# Patient Record
Sex: FEMALE | Race: BLACK OR AFRICAN AMERICAN | NOT HISPANIC OR LATINO | RURAL
[De-identification: names, ages, dates, MRNs, and addresses within clinical notes are randomized per-mention and may not be internally consistent; named-entity substitution may affect disease eponyms.]

---

## 2022-10-24 LAB — BCS RECOMMENDATION EXT: NORMAL

## 2022-12-03 ENCOUNTER — OFFICE VISIT (OUTPATIENT)
Dept: FAMILY MEDICINE | Facility: CLINIC | Age: 62
End: 2022-12-03
Payer: COMMERCIAL

## 2022-12-03 VITALS
OXYGEN SATURATION: 98 % | SYSTOLIC BLOOD PRESSURE: 164 MMHG | TEMPERATURE: 100 F | HEIGHT: 69 IN | RESPIRATION RATE: 20 BRPM | WEIGHT: 172 LBS | HEART RATE: 104 BPM | DIASTOLIC BLOOD PRESSURE: 102 MMHG | BODY MASS INDEX: 25.48 KG/M2

## 2022-12-03 DIAGNOSIS — R20.0 HAND NUMBNESS: ICD-10-CM

## 2022-12-03 DIAGNOSIS — Z20.828 CONTACT WITH OR EXPOSURE TO VIRAL DISEASE: Primary | ICD-10-CM

## 2022-12-03 LAB
CTP QC/QA: YES
CTP QC/QA: YES
FLUAV AG NPH QL: POSITIVE
FLUBV AG NPH QL: NEGATIVE
SARS-COV-2 AG RESP QL IA.RAPID: NEGATIVE

## 2022-12-03 PROCEDURE — 3080F PR MOST RECENT DIASTOLIC BLOOD PRESSURE >= 90 MM HG: ICD-10-PCS | Mod: ,,, | Performed by: FAMILY MEDICINE

## 2022-12-03 PROCEDURE — 87804 INFLUENZA ASSAY W/OPTIC: CPT | Mod: QW,,, | Performed by: FAMILY MEDICINE

## 2022-12-03 PROCEDURE — 3008F BODY MASS INDEX DOCD: CPT | Mod: ,,, | Performed by: FAMILY MEDICINE

## 2022-12-03 PROCEDURE — 99214 PR OFFICE/OUTPT VISIT, EST, LEVL IV, 30-39 MIN: ICD-10-PCS | Mod: ,,, | Performed by: FAMILY MEDICINE

## 2022-12-03 PROCEDURE — 99214 OFFICE O/P EST MOD 30 MIN: CPT | Mod: ,,, | Performed by: FAMILY MEDICINE

## 2022-12-03 PROCEDURE — 3077F PR MOST RECENT SYSTOLIC BLOOD PRESSURE >= 140 MM HG: ICD-10-PCS | Mod: ,,, | Performed by: FAMILY MEDICINE

## 2022-12-03 PROCEDURE — 87804 POCT INFLUENZA A/B: ICD-10-PCS | Mod: QW,,, | Performed by: FAMILY MEDICINE

## 2022-12-03 PROCEDURE — 3008F PR BODY MASS INDEX (BMI) DOCUMENTED: ICD-10-PCS | Mod: ,,, | Performed by: FAMILY MEDICINE

## 2022-12-03 PROCEDURE — 99051 PR MEDICAL SERVICES, EVE/WKEND/HOLIDAY: ICD-10-PCS | Mod: ,,, | Performed by: FAMILY MEDICINE

## 2022-12-03 PROCEDURE — 87426 SARSCOV CORONAVIRUS AG IA: CPT | Mod: QW,,, | Performed by: FAMILY MEDICINE

## 2022-12-03 PROCEDURE — 3077F SYST BP >= 140 MM HG: CPT | Mod: ,,, | Performed by: FAMILY MEDICINE

## 2022-12-03 PROCEDURE — 99051 MED SERV EVE/WKEND/HOLIDAY: CPT | Mod: ,,, | Performed by: FAMILY MEDICINE

## 2022-12-03 PROCEDURE — 3080F DIAST BP >= 90 MM HG: CPT | Mod: ,,, | Performed by: FAMILY MEDICINE

## 2022-12-03 PROCEDURE — 87426 SARS CORONAVIRUS 2 ANTIGEN POCT: ICD-10-PCS | Mod: QW,,, | Performed by: FAMILY MEDICINE

## 2022-12-03 RX ORDER — OSELTAMIVIR PHOSPHATE 75 MG/1
75 CAPSULE ORAL 2 TIMES DAILY
Qty: 10 CAPSULE | Refills: 0 | Status: SHIPPED | OUTPATIENT
Start: 2022-12-03 | End: 2022-12-03

## 2022-12-03 NOTE — LETTER
December 3, 2022      [facility]  [address] [facility] [place] [address]  Phone: [phone]  Fax: [phone]       Patient: Asia Schwartz   YOB: 1960  Date of Visit: 12/03/2022    To Whom It May Concern:    Parag Schwartz  was at  on 12/03/2022. The patient may return to work/school on 12/08/2022 with no restrictions. If you have any questions or concerns, or if I can be of further assistance, please do not hesitate to contact me.    Sincerely,    Dr. Gilberto Gamboa

## 2022-12-03 NOTE — PROGRESS NOTES
"Subjective:       Patient ID: Asia Schwartz is a 62 y.o. female.    Chief Complaint: Cough, Headache, Sinus Problem (Sneezing), and "Tingling" in feet    Cough sinus congestion for several days no fever.  Tingling and feet for several days she has been up on her feet much more recently.  Also with numbness in both hands when she wakes up in the morning sometimes her hands or little swollen.  No neck pain.    Cough  Associated symptoms include headaches.   Headache   Associated symptoms include coughing.   Sinus Problem  Associated symptoms include coughing and headaches.   Review of Systems   Respiratory:  Positive for cough.    Neurological:  Positive for headaches.       Objective:      Physical Exam  Constitutional:       General: She is not in acute distress.     Appearance: Normal appearance. She is not ill-appearing or toxic-appearing.   HENT:      Right Ear: Tympanic membrane normal.      Left Ear: Tympanic membrane normal.      Nose: Congestion present.      Mouth/Throat:      Pharynx: No posterior oropharyngeal erythema.   Cardiovascular:      Rate and Rhythm: Normal rate and regular rhythm.   Pulmonary:      Effort: Pulmonary effort is normal.      Breath sounds: Normal breath sounds.   Musculoskeletal:      Right shoulder: Normal.      Left shoulder: Normal.      Right wrist: Normal.      Left wrist: Normal.      Right hand: Normal.      Left hand: Normal.      Cervical back: Normal.      Right foot: Normal range of motion. No tenderness.      Left foot: Normal range of motion. No tenderness.      Comments: High arches   Neurological:      Mental Status: She is alert.      Sensory: No sensory deficit.      Motor: No weakness.      Deep Tendon Reflexes: Reflexes normal.       Assessment:       Problem List Items Addressed This Visit    None  Visit Diagnoses       Contact with or exposure to viral disease    -  Primary    Relevant Orders    POCT Influenza A/B (Completed)    SARS Coronavirus 2 Antigen, " POCT (Completed)    Hand numbness        Relevant Orders    X-Ray Chest PA And Lateral    X-Ray Cervical Spine 2 or 3 Views    Ambulatory referral/consult to Neurology              Plan:     Tamiflu  Numbness in her toes probably due to metatarsalgia.  Chest x-ray is normal C-spine shows moderate degenerative changes.  Recommend wearing shoes with more padding.  Upper extremity symptoms may be due to thoracic outlet syndrome

## 2023-02-08 ENCOUNTER — OFFICE VISIT (OUTPATIENT)
Dept: FAMILY MEDICINE | Facility: CLINIC | Age: 63
End: 2023-02-08
Payer: COMMERCIAL

## 2023-02-08 VITALS
RESPIRATION RATE: 18 BRPM | HEIGHT: 69 IN | OXYGEN SATURATION: 98 % | DIASTOLIC BLOOD PRESSURE: 72 MMHG | SYSTOLIC BLOOD PRESSURE: 120 MMHG | BODY MASS INDEX: 25.86 KG/M2 | HEART RATE: 93 BPM | WEIGHT: 174.63 LBS | TEMPERATURE: 98 F

## 2023-02-08 DIAGNOSIS — J01.00 ACUTE NON-RECURRENT MAXILLARY SINUSITIS: Primary | ICD-10-CM

## 2023-02-08 PROCEDURE — 3008F BODY MASS INDEX DOCD: CPT | Mod: ,,, | Performed by: NURSE PRACTITIONER

## 2023-02-08 PROCEDURE — 3078F DIAST BP <80 MM HG: CPT | Mod: ,,, | Performed by: NURSE PRACTITIONER

## 2023-02-08 PROCEDURE — 99213 PR OFFICE/OUTPT VISIT, EST, LEVL III, 20-29 MIN: ICD-10-PCS | Mod: 25,,, | Performed by: NURSE PRACTITIONER

## 2023-02-08 PROCEDURE — 3074F PR MOST RECENT SYSTOLIC BLOOD PRESSURE < 130 MM HG: ICD-10-PCS | Mod: ,,, | Performed by: NURSE PRACTITIONER

## 2023-02-08 PROCEDURE — 3078F PR MOST RECENT DIASTOLIC BLOOD PRESSURE < 80 MM HG: ICD-10-PCS | Mod: ,,, | Performed by: NURSE PRACTITIONER

## 2023-02-08 PROCEDURE — 96372 PR INJECTION,THERAP/PROPH/DIAG2ST, IM OR SUBCUT: ICD-10-PCS | Mod: ,,, | Performed by: NURSE PRACTITIONER

## 2023-02-08 PROCEDURE — 99213 OFFICE O/P EST LOW 20 MIN: CPT | Mod: 25,,, | Performed by: NURSE PRACTITIONER

## 2023-02-08 PROCEDURE — 3074F SYST BP LT 130 MM HG: CPT | Mod: ,,, | Performed by: NURSE PRACTITIONER

## 2023-02-08 PROCEDURE — 3008F PR BODY MASS INDEX (BMI) DOCUMENTED: ICD-10-PCS | Mod: ,,, | Performed by: NURSE PRACTITIONER

## 2023-02-08 PROCEDURE — 96372 THER/PROPH/DIAG INJ SC/IM: CPT | Mod: ,,, | Performed by: NURSE PRACTITIONER

## 2023-02-08 RX ORDER — CHLORPHENIRAMINE MALEATE AND DEXTROMETHORPHAN HYDROBROMIDE 4; 30 MG/1; MG/1
1 TABLET, FILM COATED ORAL EVERY 6 HOURS
Qty: 20 EACH | Refills: 1 | Status: SHIPPED | OUTPATIENT
Start: 2023-02-08 | End: 2023-02-18

## 2023-02-08 RX ORDER — FLUCONAZOLE 150 MG/1
150 TABLET ORAL DAILY
Qty: 1 TABLET | Refills: 0 | Status: SHIPPED | OUTPATIENT
Start: 2023-02-08 | End: 2023-02-09

## 2023-02-08 RX ORDER — CEFTRIAXONE 1 G/1
1 INJECTION, POWDER, FOR SOLUTION INTRAMUSCULAR; INTRAVENOUS
Status: COMPLETED | OUTPATIENT
Start: 2023-02-08 | End: 2023-02-08

## 2023-02-08 RX ORDER — AMOXICILLIN AND CLAVULANATE POTASSIUM 875; 125 MG/1; MG/1
1 TABLET, FILM COATED ORAL 2 TIMES DAILY
Qty: 20 TABLET | Refills: 0 | Status: SHIPPED | OUTPATIENT
Start: 2023-02-08 | End: 2023-02-18

## 2023-02-08 RX ORDER — DEXAMETHASONE SODIUM PHOSPHATE 4 MG/ML
8 INJECTION, SOLUTION INTRA-ARTICULAR; INTRALESIONAL; INTRAMUSCULAR; INTRAVENOUS; SOFT TISSUE ONCE
Status: COMPLETED | OUTPATIENT
Start: 2023-02-08 | End: 2023-02-08

## 2023-02-08 RX ADMIN — CEFTRIAXONE 1 G: 1 INJECTION, POWDER, FOR SOLUTION INTRAMUSCULAR; INTRAVENOUS at 02:02

## 2023-02-08 RX ADMIN — DEXAMETHASONE SODIUM PHOSPHATE 8 MG: 4 INJECTION, SOLUTION INTRA-ARTICULAR; INTRALESIONAL; INTRAMUSCULAR; INTRAVENOUS; SOFT TISSUE at 02:02

## 2023-02-08 NOTE — LETTER
February 8, 2023      [facility]  [address] [facility] [place] [address]  Phone: [phone]  Fax: [phone]       Patient: Asia Schwartz   YOB: 1960  Date of Visit: 02/08/2023    To Whom It May Concern:    Parag Schwartz  was at [facility] on 02/08/2023. The patient may return to work/school on 02/09/2023 with no restrictions. If you have any questions or concerns, or if I can be of further assistance, please do not hesitate to contact me.    Sincerely,    Mat Klein LPN

## 2023-02-08 NOTE — PROGRESS NOTES
Subjective:       Patient ID: Asia Schwartz is a 62 y.o. female.    Chief Complaint: Cough and Sinusitis    Sinus and cough-refused testing  Review of Systems   Constitutional:  Negative for appetite change, fatigue and fever.   HENT:  Positive for nasal congestion and sinus pressure/congestion. Negative for ear pain and sore throat.    Eyes:  Negative for pain, discharge and itching.   Respiratory:  Positive for cough. Negative for shortness of breath.    Cardiovascular:  Negative for chest pain and leg swelling.   Gastrointestinal:  Negative for abdominal pain, change in bowel habit, nausea, vomiting and change in bowel habit.   Musculoskeletal:  Negative for back pain, gait problem and neck pain.   Integumentary:  Negative for rash and wound.   Allergic/Immunologic: Negative for immunocompromised state.   Neurological:  Negative for dizziness, weakness and headaches.   All other systems reviewed and are negative.      Objective:      Physical Exam  Vitals and nursing note reviewed.   Constitutional:       General: She is not in acute distress.     Appearance: Normal appearance. She is not ill-appearing, toxic-appearing or diaphoretic.   HENT:      Head: Normocephalic.      Right Ear: Tympanic membrane, ear canal and external ear normal.      Left Ear: Tympanic membrane, ear canal and external ear normal.      Nose: Mucosal edema and congestion present. No rhinorrhea.      Right Turbinates: Swollen.      Left Turbinates: Swollen.      Right Sinus: Maxillary sinus tenderness present.      Left Sinus: Maxillary sinus tenderness present.      Mouth/Throat:      Mouth: Mucous membranes are moist.      Pharynx: Posterior oropharyngeal erythema present. No oropharyngeal exudate.   Eyes:      General: No scleral icterus.        Right eye: No discharge.         Left eye: No discharge.      Extraocular Movements: Extraocular movements intact.      Conjunctiva/sclera: Conjunctivae normal.      Pupils: Pupils are equal,  round, and reactive to light.   Cardiovascular:      Rate and Rhythm: Normal rate and regular rhythm.      Pulses: Normal pulses.      Heart sounds: Normal heart sounds. No murmur heard.  Pulmonary:      Effort: Pulmonary effort is normal. No respiratory distress.      Breath sounds: Normal breath sounds. No wheezing, rhonchi or rales.   Musculoskeletal:         General: Normal range of motion.      Cervical back: Neck supple. No tenderness.   Lymphadenopathy:      Cervical: No cervical adenopathy.   Skin:     General: Skin is warm and dry.      Capillary Refill: Capillary refill takes less than 2 seconds.      Findings: No rash.   Neurological:      Mental Status: She is alert and oriented to person, place, and time.   Psychiatric:         Mood and Affect: Mood normal.         Behavior: Behavior normal.         Thought Content: Thought content normal.         Judgment: Judgment normal.          Assessment:       1. Acute non-recurrent maxillary sinusitis        Plan:   Acute non-recurrent maxillary sinusitis  -     dexAMETHasone injection 8 mg  -     cefTRIAXone injection 1 g  -     amoxicillin-clavulanate 875-125mg (AUGMENTIN) 875-125 mg per tablet; Take 1 tablet by mouth 2 (two) times daily. for 20 doses  Dispense: 20 tablet; Refill: 0  -     chlorpheniramine-dextromethorp (CORICIDIN HBP COUGH AND COLD) 4-30 mg Tab; Take 1 tablet by mouth every 6 (six) hours. for 10 days  Dispense: 20 each; Refill: 1  -     fluconazole (DIFLUCAN) 150 MG Tab; Take 1 tablet (150 mg total) by mouth once daily. for 1 day  Dispense: 1 tablet; Refill: 0         Risks, benefits, and side effects were discussed with the patient. All questions were answered to the fullest satisfaction of the patient, and pt verbalized understanding and agreement to treatment plan. Pt was to call with any new or worsening symptoms, or present to the ER

## 2023-04-13 ENCOUNTER — HOSPITAL ENCOUNTER (EMERGENCY)
Facility: HOSPITAL | Age: 63
Discharge: HOME | End: 2023-04-13
Payer: COMMERCIAL

## 2023-04-13 ENCOUNTER — OFFICE VISIT (OUTPATIENT)
Dept: FAMILY MEDICINE | Facility: CLINIC | Age: 63
End: 2023-04-13
Payer: COMMERCIAL

## 2023-04-13 VITALS
RESPIRATION RATE: 18 BRPM | TEMPERATURE: 99 F | WEIGHT: 173 LBS | DIASTOLIC BLOOD PRESSURE: 101 MMHG | HEART RATE: 90 BPM | OXYGEN SATURATION: 96 % | HEIGHT: 69 IN | SYSTOLIC BLOOD PRESSURE: 189 MMHG | BODY MASS INDEX: 25.62 KG/M2

## 2023-04-13 VITALS
TEMPERATURE: 99 F | HEIGHT: 69 IN | SYSTOLIC BLOOD PRESSURE: 183 MMHG | OXYGEN SATURATION: 98 % | DIASTOLIC BLOOD PRESSURE: 110 MMHG | WEIGHT: 173 LBS | HEART RATE: 82 BPM | RESPIRATION RATE: 18 BRPM | BODY MASS INDEX: 25.62 KG/M2

## 2023-04-13 DIAGNOSIS — I10 ESSENTIAL HYPERTENSION: ICD-10-CM

## 2023-04-13 DIAGNOSIS — Z20.828 VIRAL DISEASE EXPOSURE: ICD-10-CM

## 2023-04-13 DIAGNOSIS — I16.0 HYPERTENSIVE URGENCY: Primary | ICD-10-CM

## 2023-04-13 DIAGNOSIS — R03.0 ELEVATED BLOOD PRESSURE READING: ICD-10-CM

## 2023-04-13 DIAGNOSIS — J32.9 SINUSITIS, UNSPECIFIED CHRONICITY, UNSPECIFIED LOCATION: ICD-10-CM

## 2023-04-13 DIAGNOSIS — I10 HYPERTENSION, UNSPECIFIED TYPE: Primary | ICD-10-CM

## 2023-04-13 DIAGNOSIS — H10.9 CONJUNCTIVITIS OF LEFT EYE, UNSPECIFIED CONJUNCTIVITIS TYPE: ICD-10-CM

## 2023-04-13 DIAGNOSIS — Z13.1 SCREENING FOR DIABETES MELLITUS: ICD-10-CM

## 2023-04-13 LAB
ALBUMIN SERPL BCP-MCNC: 4 G/DL (ref 3.5–5)
ALBUMIN/GLOB SERPL: 1.1 {RATIO}
ALP SERPL-CCNC: 96 U/L (ref 50–130)
ALT SERPL W P-5'-P-CCNC: 36 U/L (ref 13–56)
ANION GAP SERPL CALCULATED.3IONS-SCNC: 13 MMOL/L (ref 7–16)
AST SERPL W P-5'-P-CCNC: 21 U/L (ref 15–37)
BACTERIA #/AREA URNS HPF: ABNORMAL /HPF
BASOPHILS # BLD AUTO: 0.03 K/UL (ref 0–0.2)
BASOPHILS NFR BLD AUTO: 0.3 % (ref 0–1)
BILIRUB SERPL-MCNC: 0.3 MG/DL (ref ?–1.2)
BILIRUB UR QL STRIP: NEGATIVE
BUN SERPL-MCNC: 9 MG/DL (ref 7–18)
BUN/CREAT SERPL: 13 (ref 6–20)
CALCIUM SERPL-MCNC: 9.2 MG/DL (ref 8.5–10.1)
CHLORIDE SERPL-SCNC: 105 MMOL/L (ref 98–107)
CLARITY UR: CLEAR
CO2 SERPL-SCNC: 29 MMOL/L (ref 21–32)
COLOR UR: ABNORMAL
CREAT SERPL-MCNC: 0.68 MG/DL (ref 0.55–1.02)
CTP QC/QA: YES
DIFFERENTIAL METHOD BLD: ABNORMAL
EGFR (NO RACE VARIABLE) (RUSH/TITUS): 100 ML/MIN/1.73M²
EOSINOPHIL # BLD AUTO: 0.14 K/UL (ref 0–0.5)
EOSINOPHIL NFR BLD AUTO: 1.6 % (ref 1–4)
ERYTHROCYTE [DISTWIDTH] IN BLOOD BY AUTOMATED COUNT: 15.9 % (ref 11.5–14.5)
GLOBULIN SER-MCNC: 3.8 G/DL (ref 2–4)
GLUCOSE SERPL-MCNC: 106 MG/DL (ref 70–110)
GLUCOSE SERPL-MCNC: 96 MG/DL (ref 74–106)
GLUCOSE UR STRIP-MCNC: NORMAL MG/DL
HCT VFR BLD AUTO: 43.5 % (ref 38–47)
HGB BLD-MCNC: 13.3 G/DL (ref 12–16)
IMM GRANULOCYTES # BLD AUTO: 0.09 K/UL (ref 0–0.04)
IMM GRANULOCYTES NFR BLD: 1 % (ref 0–0.4)
KETONES UR STRIP-SCNC: NEGATIVE MG/DL
LEUKOCYTE ESTERASE UR QL STRIP: ABNORMAL
LYMPHOCYTES # BLD AUTO: 3.46 K/UL (ref 1–4.8)
LYMPHOCYTES NFR BLD AUTO: 39.8 % (ref 27–41)
MCH RBC QN AUTO: 26.3 PG (ref 27–31)
MCHC RBC AUTO-ENTMCNC: 30.6 G/DL (ref 32–36)
MCV RBC AUTO: 86.1 FL (ref 80–96)
MONOCYTES # BLD AUTO: 0.69 K/UL (ref 0–0.8)
MONOCYTES NFR BLD AUTO: 7.9 % (ref 2–6)
MPC BLD CALC-MCNC: 10.4 FL (ref 9.4–12.4)
MUCOUS THREADS #/AREA URNS HPF: ABNORMAL /HPF
NEUTROPHILS # BLD AUTO: 4.28 K/UL (ref 1.8–7.7)
NEUTROPHILS NFR BLD AUTO: 49.4 % (ref 53–65)
NITRITE UR QL STRIP: NEGATIVE
NRBC # BLD AUTO: 0 X10E3/UL
NRBC, AUTO (.00): 0 %
PH UR STRIP: 5.5 PH UNITS
PLATELET # BLD AUTO: 310 K/UL (ref 150–400)
POTASSIUM SERPL-SCNC: 3.6 MMOL/L (ref 3.5–5.1)
PROT SERPL-MCNC: 7.8 G/DL (ref 6.4–8.2)
PROT UR QL STRIP: NEGATIVE
RBC # BLD AUTO: 5.05 M/UL (ref 4.2–5.4)
RBC # UR STRIP: NEGATIVE /UL
RBC #/AREA URNS HPF: ABNORMAL /HPF
SARS-COV-2 AG RESP QL IA.RAPID: NEGATIVE
SODIUM SERPL-SCNC: 143 MMOL/L (ref 136–145)
SP GR UR STRIP: 1.01
SQUAMOUS #/AREA URNS LPF: ABNORMAL /LPF
TRICHOMONAS #/AREA URNS HPF: ABNORMAL /HPF
UROBILINOGEN UR STRIP-ACNC: NORMAL MG/DL
WBC # BLD AUTO: 8.69 K/UL (ref 4.5–11)
WBC #/AREA URNS HPF: ABNORMAL /HPF
YEAST #/AREA URNS HPF: ABNORMAL /HPF

## 2023-04-13 PROCEDURE — 81001 URINALYSIS AUTO W/SCOPE: CPT | Performed by: NURSE PRACTITIONER

## 2023-04-13 PROCEDURE — 87426 SARS CORONAVIRUS 2 ANTIGEN POCT: ICD-10-PCS | Mod: QW,,, | Performed by: NURSE PRACTITIONER

## 2023-04-13 PROCEDURE — 3077F SYST BP >= 140 MM HG: CPT | Mod: ,,, | Performed by: NURSE PRACTITIONER

## 2023-04-13 PROCEDURE — 87426 SARSCOV CORONAVIRUS AG IA: CPT | Mod: QW,,, | Performed by: NURSE PRACTITIONER

## 2023-04-13 PROCEDURE — 3080F PR MOST RECENT DIASTOLIC BLOOD PRESSURE >= 90 MM HG: ICD-10-PCS | Mod: ,,, | Performed by: NURSE PRACTITIONER

## 2023-04-13 PROCEDURE — 3080F DIAST BP >= 90 MM HG: CPT | Mod: ,,, | Performed by: NURSE PRACTITIONER

## 2023-04-13 PROCEDURE — 99285 PR EMERGENCY DEPT VISIT,LEVEL V: ICD-10-PCS | Mod: ,,, | Performed by: NURSE PRACTITIONER

## 2023-04-13 PROCEDURE — 96372 THER/PROPH/DIAG INJ SC/IM: CPT | Mod: ,,, | Performed by: NURSE PRACTITIONER

## 2023-04-13 PROCEDURE — 93010 EKG 12-LEAD: ICD-10-PCS | Mod: ,,, | Performed by: HOSPITALIST

## 2023-04-13 PROCEDURE — 93010 ELECTROCARDIOGRAM REPORT: CPT | Mod: ,,, | Performed by: HOSPITALIST

## 2023-04-13 PROCEDURE — 96374 THER/PROPH/DIAG INJ IV PUSH: CPT

## 2023-04-13 PROCEDURE — 3077F PR MOST RECENT SYSTOLIC BLOOD PRESSURE >= 140 MM HG: ICD-10-PCS | Mod: ,,, | Performed by: NURSE PRACTITIONER

## 2023-04-13 PROCEDURE — 63600175 PHARM REV CODE 636 W HCPCS: Performed by: NURSE PRACTITIONER

## 2023-04-13 PROCEDURE — 99285 EMERGENCY DEPT VISIT HI MDM: CPT | Mod: ,,, | Performed by: NURSE PRACTITIONER

## 2023-04-13 PROCEDURE — 3008F BODY MASS INDEX DOCD: CPT | Mod: ,,, | Performed by: NURSE PRACTITIONER

## 2023-04-13 PROCEDURE — 93005 ELECTROCARDIOGRAM TRACING: CPT

## 2023-04-13 PROCEDURE — 80053 COMPREHEN METABOLIC PANEL: CPT | Performed by: NURSE PRACTITIONER

## 2023-04-13 PROCEDURE — 85025 COMPLETE CBC W/AUTO DIFF WBC: CPT | Performed by: NURSE PRACTITIONER

## 2023-04-13 PROCEDURE — 25000003 PHARM REV CODE 250: Performed by: NURSE PRACTITIONER

## 2023-04-13 PROCEDURE — 3008F PR BODY MASS INDEX (BMI) DOCUMENTED: ICD-10-PCS | Mod: ,,, | Performed by: NURSE PRACTITIONER

## 2023-04-13 PROCEDURE — 99213 PR OFFICE/OUTPT VISIT, EST, LEVL III, 20-29 MIN: ICD-10-PCS | Mod: 25,,, | Performed by: NURSE PRACTITIONER

## 2023-04-13 PROCEDURE — 99284 EMERGENCY DEPT VISIT MOD MDM: CPT | Mod: 25

## 2023-04-13 PROCEDURE — 99213 OFFICE O/P EST LOW 20 MIN: CPT | Mod: 25,,, | Performed by: NURSE PRACTITIONER

## 2023-04-13 PROCEDURE — 96372 PR INJECTION,THERAP/PROPH/DIAG2ST, IM OR SUBCUT: ICD-10-PCS | Mod: ,,, | Performed by: NURSE PRACTITIONER

## 2023-04-13 RX ORDER — OLOPATADINE HYDROCHLORIDE 1 MG/ML
1 SOLUTION/ DROPS OPHTHALMIC 2 TIMES DAILY PRN
Qty: 5 ML | Refills: 0 | Status: SHIPPED | OUTPATIENT
Start: 2023-04-13 | End: 2023-05-24

## 2023-04-13 RX ORDER — LINCOMYCIN HYDROCHLORIDE 300 MG/ML
600 INJECTION, SOLUTION INTRAMUSCULAR; INTRAVENOUS; SUBCONJUNCTIVAL
Status: COMPLETED | OUTPATIENT
Start: 2023-04-13 | End: 2023-04-13

## 2023-04-13 RX ORDER — AMLODIPINE BESYLATE 5 MG/1
5 TABLET ORAL DAILY
Qty: 30 TABLET | Refills: 2 | Status: SHIPPED | OUTPATIENT
Start: 2023-04-13 | End: 2023-08-24 | Stop reason: SDUPTHER

## 2023-04-13 RX ORDER — HYDROCHLOROTHIAZIDE 12.5 MG/1
12.5 TABLET ORAL DAILY
Qty: 30 TABLET | Refills: 2 | Status: SHIPPED | OUTPATIENT
Start: 2023-04-13 | End: 2023-08-24 | Stop reason: SDUPTHER

## 2023-04-13 RX ORDER — AMLODIPINE BESYLATE 5 MG/1
5 TABLET ORAL
Status: COMPLETED | OUTPATIENT
Start: 2023-04-13 | End: 2023-04-13

## 2023-04-13 RX ORDER — HYDROCHLOROTHIAZIDE 12.5 MG/1
12.5 TABLET ORAL DAILY
Qty: 30 TABLET | Refills: 11 | Status: SHIPPED | OUTPATIENT
Start: 2023-04-13 | End: 2023-04-13

## 2023-04-13 RX ORDER — HYDRALAZINE HYDROCHLORIDE 20 MG/ML
10 INJECTION INTRAMUSCULAR; INTRAVENOUS
Status: COMPLETED | OUTPATIENT
Start: 2023-04-13 | End: 2023-04-13

## 2023-04-13 RX ORDER — CLINDAMYCIN HYDROCHLORIDE 300 MG/1
300 CAPSULE ORAL 4 TIMES DAILY
Qty: 40 CAPSULE | Refills: 0 | Status: SHIPPED | OUTPATIENT
Start: 2023-04-13 | End: 2023-04-23

## 2023-04-13 RX ORDER — HYDROCHLOROTHIAZIDE 25 MG/1
25 TABLET ORAL
Status: COMPLETED | OUTPATIENT
Start: 2023-04-13 | End: 2023-04-13

## 2023-04-13 RX ORDER — CIPROFLOXACIN HYDROCHLORIDE 3 MG/ML
1 SOLUTION/ DROPS OPHTHALMIC 4 TIMES DAILY
Qty: 5 ML | Refills: 0 | Status: SHIPPED | OUTPATIENT
Start: 2023-04-13 | End: 2023-04-21

## 2023-04-13 RX ORDER — AMLODIPINE BESYLATE 5 MG/1
5 TABLET ORAL DAILY
Qty: 30 TABLET | Refills: 11 | Status: SHIPPED | OUTPATIENT
Start: 2023-04-13 | End: 2023-04-13

## 2023-04-13 RX ADMIN — AMLODIPINE BESYLATE 5 MG: 5 TABLET ORAL at 02:04

## 2023-04-13 RX ADMIN — LINCOMYCIN HYDROCHLORIDE 600 MG: 300 INJECTION, SOLUTION INTRAMUSCULAR; INTRAVENOUS; SUBCONJUNCTIVAL at 10:04

## 2023-04-13 RX ADMIN — HYDROCHLOROTHIAZIDE 25 MG: 25 TABLET ORAL at 02:04

## 2023-04-13 RX ADMIN — HYDRALAZINE HYDROCHLORIDE 10 MG: 20 INJECTION INTRAMUSCULAR; INTRAVENOUS at 12:04

## 2023-04-13 NOTE — PROGRESS NOTES
"Subjective:       Patient ID: Asia Schwartz is a 62 y.o. female.    Chief Complaint: Sinus Problem (Pt present to the clinic with sinus problems. She is having pressure in her head, eyes, and nose. Her left eye has redness. She says her eyes have also been watering, this all started on Tuesday and progressed yesterday. ) and Other (Patient wants to be checked for diabetes. She has been having tingling in her arms and legs, and has a family hx.  )    Presents to clinic  as above. No fever. HA is not worst HA of life. No double vision, change in vision, or foreign body in eye sensation. She is mildly photophobic. Eye is pruritic. She does not wear contacts. Reports polydipsia. Has some mild tingling in feet after working all day. No leg weakness. No back pain. BP elevated on triage and checked manually by me and was 190/110 in left arm while sitting. Denies hx of HTN.     Review of Systems   Constitutional: Negative.    HENT:  Positive for congestion and sinus pain. Negative for ear pain and sore throat.    Respiratory: Negative.     Cardiovascular: Negative.    Musculoskeletal:  Positive for myalgias.   Neurological:  Positive for headaches.   Endo/Heme/Allergies:  Positive for polydipsia.        Reviewed family, medical, surgical, and social history.    Objective:      BP (!) 183/110 (BP Location: Left arm, Patient Position: Sitting, BP Method: Large (Automatic))   Pulse 82   Temp 99 °F (37.2 °C) (Oral)   Resp 18   Ht 5' 9" (1.753 m)   Wt 78.5 kg (173 lb)   SpO2 98%   BMI 25.55 kg/m²   Physical Exam  Vitals and nursing note reviewed.   Constitutional:       General: She is not in acute distress.     Appearance: Normal appearance. She is normal weight. She is not ill-appearing, toxic-appearing or diaphoretic.   HENT:      Head: Normocephalic and atraumatic.      Right Ear: Hearing, tympanic membrane, ear canal and external ear normal.      Left Ear: Hearing, tympanic membrane, ear canal and external ear " normal.      Nose: Nasal tenderness, mucosal edema, congestion and rhinorrhea present. Rhinorrhea is purulent.      Right Turbinates: Enlarged and swollen.      Left Turbinates: Enlarged and swollen.      Right Sinus: Maxillary sinus tenderness and frontal sinus tenderness present.      Left Sinus: Maxillary sinus tenderness and frontal sinus tenderness present.      Mouth/Throat:      Mouth: Mucous membranes are moist.      Pharynx: No oropharyngeal exudate or posterior oropharyngeal erythema.   Eyes:      General: Lids are normal.         Left eye: Discharge present.No hordeolum.      Extraocular Movements:      Left eye: Normal extraocular motion and no nystagmus.      Conjunctiva/sclera:      Right eye: Right conjunctiva is not injected. No chemosis, exudate or hemorrhage.     Left eye: Left conjunctiva is not injected. No exudate.     Comments: Right conjunctival and scleral edema with redness and clear drainage. Unable to stain eye because clinic out of eye stain.    Cardiovascular:      Rate and Rhythm: Normal rate and regular rhythm.      Heart sounds: Normal heart sounds, S1 normal and S2 normal.   Pulmonary:      Effort: Pulmonary effort is normal.      Breath sounds: Normal breath sounds.   Musculoskeletal:         General: No swelling, tenderness, deformity or signs of injury. Normal range of motion.      Cervical back: Normal range of motion and neck supple. No rigidity or tenderness.      Right lower leg: No edema.      Left lower leg: No edema.      Comments: No leg weakness.   Lymphadenopathy:      Cervical: No cervical adenopathy.   Skin:     General: Skin is warm and dry.   Neurological:      Mental Status: She is alert.   Psychiatric:         Mood and Affect: Mood normal.         Behavior: Behavior normal.         Thought Content: Thought content normal.         Judgment: Judgment normal.          Office Visit on 04/13/2023   Component Date Value Ref Range Status    SARS Coronavirus 2 Antigen  04/13/2023 Negative  Negative Final     Acceptable 04/13/2023 Yes   Final    POC Glucose 04/13/2023 106  70 - 110 MG/DL Final      Assessment:       1. Hypertensive urgency    2. Conjunctivitis of left eye, unspecified conjunctivitis type    3. Sinusitis, unspecified chronicity, unspecified location    4. Essential hypertension    5. Viral disease exposure    6. Screening for diabetes mellitus        Plan:       Hypertensive urgency  -     Discontinue: amLODIPine (NORVASC) 5 MG tablet; Take 1 tablet (5 mg total) by mouth once daily.  Dispense: 30 tablet; Refill: 11  -     Discontinue: hydroCHLOROthiazide (HYDRODIURIL) 12.5 MG Tab; Take 1 tablet (12.5 mg total) by mouth once daily.  Dispense: 30 tablet; Refill: 11  -     Comprehensive Metabolic Panel; Future; Expected date: 04/13/2023  -     Thyroid Panel; Future; Expected date: 04/13/2023  -     Ambulatory referral/consult to Family Practice; Future; Expected date: 04/20/2023  -     amLODIPine (NORVASC) 5 MG tablet; Take 1 tablet (5 mg total) by mouth once daily.  Dispense: 30 tablet; Refill: 2  -     hydroCHLOROthiazide (HYDRODIURIL) 12.5 MG Tab; Take 1 tablet (12.5 mg total) by mouth once daily.  Dispense: 30 tablet; Refill: 2    Conjunctivitis of left eye, unspecified conjunctivitis type  -     ciprofloxacin HCl (CILOXAN) 0.3 % ophthalmic solution; Place 1 drop into the left eye 4 (four) times daily. for 7 days  Dispense: 5 mL; Refill: 0  -     olopatadine (PATANOL) 0.1 % ophthalmic solution; Place 1 drop into the left eye 2 (two) times daily as needed (itchy eye).  Dispense: 5 mL; Refill: 0    Sinusitis, unspecified chronicity, unspecified location  -     lincomycin injection 600 mg  -     clindamycin (CLEOCIN) 300 MG capsule; Take 1 capsule (300 mg total) by mouth 4 (four) times daily. for 10 days  Dispense: 40 capsule; Refill: 0    Essential hypertension  -     Discontinue: amLODIPine (NORVASC) 5 MG tablet; Take 1 tablet (5 mg total) by mouth  once daily.  Dispense: 30 tablet; Refill: 11  -     Discontinue: hydroCHLOROthiazide (HYDRODIURIL) 12.5 MG Tab; Take 1 tablet (12.5 mg total) by mouth once daily.  Dispense: 30 tablet; Refill: 11  -     Comprehensive Metabolic Panel; Future; Expected date: 04/13/2023  -     Thyroid Panel; Future; Expected date: 04/13/2023  -     Ambulatory referral/consult to Family Practice; Future; Expected date: 04/20/2023  -     amLODIPine (NORVASC) 5 MG tablet; Take 1 tablet (5 mg total) by mouth once daily.  Dispense: 30 tablet; Refill: 2  -     hydroCHLOROthiazide (HYDRODIURIL) 12.5 MG Tab; Take 1 tablet (12.5 mg total) by mouth once daily.  Dispense: 30 tablet; Refill: 2    Viral disease exposure  -     SARS Coronavirus 2 Antigen, POCT    Screening for diabetes mellitus  -     POCT glucose    Advised to go to ER for HTN urgency  Advised to see Eye doctor  Referring to a PCP. F/U with us if they have not reached out to you within 1 week  Keep BP log and notify me if BP less than 110/70. BP goal less than 140/90  RTC PRN          Risks, benefits, and side effects were discussed with the patient. All questions were answered to the fullest satisfaction of the patient, and pt verbalized understanding and agreement to treatment plan. Pt was to call with any new or worsening symptoms, or present to the ER.

## 2023-04-13 NOTE — PATIENT INSTRUCTIONS
Go to ER for Hypertensive Urgency    See an eye doctor ASAP    Monitor BP daily and record. Take log with you to family practice appointment. Goal BP is less than 140/90. Notify me if drops lower than 110/70    Follow-up  with us if family practice does not contact you within 1 week    Return to clinic as needed.

## 2023-04-13 NOTE — ED TRIAGE NOTES
Went to immediate care for her sinuses and was sent here for HTN. Her b/p was 185/110 at immediate care. Has never been dx with HTN

## 2023-04-13 NOTE — ED PROVIDER NOTES
Encounter Date: 4/13/2023       History     Chief Complaint   Patient presents with    Hypertension     62 year old female presents to ED for elevated BP. Patient was seen at urgent care for sinus infection and was noted to have elevated blood pressure reading of 183/110. Patient denies history of hypertension. Headache to frontal region of head she states started 2 days ago due to her sinuses. Denies weakness/dizziness, chest pain, shortness of breath, visual disturbances, nausea/vomiting. Patient states family history of hypertension. Patient is a current smoker of 0.5/ppd    The history is provided by the patient and medical records.   Hypertension   This is a new problem. The current episode started today. The problem has been unchanged. Associated symptoms include headaches. Pertinent negatives include no chest pain, no palpitations, no dizziness and no shortness of breath. Risk factors include smoking/tobacco exposure and family history.   Review of patient's allergies indicates:  No Known Allergies  History reviewed. No pertinent past medical history.  History reviewed. No pertinent surgical history.  History reviewed. No pertinent family history.  Social History     Tobacco Use    Smoking status: Every Day     Packs/day: 0.50     Types: Cigarettes    Smokeless tobacco: Never     Review of Systems   Constitutional:  Negative for chills and fever.   HENT:  Positive for congestion, sinus pressure and sinus pain.    Respiratory:  Negative for cough and shortness of breath.    Cardiovascular:  Negative for chest pain and palpitations.   Gastrointestinal:  Negative for nausea and vomiting.   Endocrine: Negative for cold intolerance and heat intolerance.   Neurological:  Positive for headaches. Negative for dizziness.   All other systems reviewed and are negative.    Physical Exam     Initial Vitals   BP Pulse Resp Temp SpO2   04/13/23 1023 04/13/23 1017 04/13/23 1017 04/13/23 1017 04/13/23 1017   (!) 193/112 90 18  99 °F (37.2 °C) 96 %      MAP       --                Physical Exam    Nursing note and vitals reviewed.  Constitutional: She appears well-developed and well-nourished.   HENT:   Head: Normocephalic and atraumatic.   Eyes: EOM are normal. Pupils are equal, round, and reactive to light. Right eye exhibits no discharge. Left eye exhibits no discharge. Right conjunctiva has a hemorrhage.   Neck: Neck supple.   Normal range of motion.  Cardiovascular:  Normal rate and regular rhythm.           Pulmonary/Chest: She has no wheezes. She has no rhonchi.   Abdominal: Abdomen is soft. She exhibits no distension. There is no abdominal tenderness.   Musculoskeletal:         General: No tenderness or edema.      Cervical back: Normal range of motion and neck supple.     Lymphadenopathy:     She has no cervical adenopathy.   Neurological: She is alert and oriented to person, place, and time. No cranial nerve deficit or sensory deficit.   Skin: Skin is warm and dry. Capillary refill takes less than 2 seconds.   Psychiatric: She has a normal mood and affect. Thought content normal.       Medical Screening Exam   See Full Note    ED Course   Procedures  Labs Reviewed   URINALYSIS, REFLEX TO URINE CULTURE - Abnormal; Notable for the following components:       Result Value    Leukocytes, UA Trace (*)     All other components within normal limits   CBC WITH DIFFERENTIAL - Abnormal; Notable for the following components:    MCH 26.3 (*)     MCHC 30.6 (*)     RDW 15.9 (*)     Neutrophils % 49.4 (*)     Monocytes % 7.9 (*)     Immature Granulocytes % 1.0 (*)     Immature Granulocytes, Absolute 0.09 (*)     All other components within normal limits   URINALYSIS, MICROSCOPIC - Abnormal; Notable for the following components:    Bacteria, UA Few (*)     Squamous Epithelial Cells, UA Few (*)     All other components within normal limits   CBC W/ AUTO DIFFERENTIAL    Narrative:     The following orders were created for panel order CBC auto  differential.  Procedure                               Abnormality         Status                     ---------                               -----------         ------                     CBC with Differential[[id number]]        Abnormal            Final result                 Please view results for these tests on the individual orders.   COMPREHENSIVE METABOLIC PANEL   EXTRA TUBES    Narrative:     The following orders were created for panel order EXTRA TUBES.  Procedure                               Abnormality         Status                     ---------                               -----------         ------                     Light Blue Top Hold[[id number]]                              In process                 Light Green Top Hold[[id number]]                             In process                   Please view results for these tests on the individual orders.   LIGHT BLUE TOP HOLD   LIGHT GREEN TOP HOLD        ECG Results              EKG 12-lead (In process)  Result time 04/13/23 13:04:02      In process by Interface, Lab In [facility] (04/13/23 13:04:02)                   Narrative:    Test Reason : R03.0,    Vent. Rate : 075 BPM     Atrial Rate : 075 BPM     P-R Int : 146 ms          QRS Dur : 092 ms      QT Int : 404 ms       P-R-T Axes : 071 269 075 degrees     QTc Int : 451 ms    Sinus rhythm with Premature atrial complexes  Right superior axis deviation  Pulmonary disease pattern  Right ventricular hypertrophy  Abnormal ECG  No previous ECGs available    Referred By: [email or web address]   SELF           Confirmed By:                                   Imaging Results    None          Medications   hydrALAZINE injection 10 mg (10 mg Intravenous Given 4/13/23 1229)   amLODIPine tablet 5 mg (5 mg Oral Given 4/13/23 1402)   hydroCHLOROthiazide tablet 25 mg (25 mg Oral Given 4/13/23 1403)     Medical Decision Making:   Initial Assessment:   hypertension  Differential Diagnosis:   Hypertensive urgency  Uncontrolled  hypertension  Clinical Tests:   Lab Tests: Ordered and Reviewed  Medical Tests: Ordered and Reviewed  ED Management:  MDM    Patient presents for emergent evaluation of acute elevated blood pressure that poses a threat to life and/or bodily function.    In the ED patient found to have acute hypertension.    I ordered labs and personally reviewed them.  Labs significant for trace leukocytes, few bacteria; remaining labs unremarkable  I ordered EKG and personally reviewed it.  EKG significant for SR with PACs; pulmonary disease process; right ventricular hypertrophy.      Discharge MDM  Patient was managed in the ED with IV hydralazine; PO Norvasc, HCTZ.    The response to treatment was fair.    Patient was discharged in stable condition.  Detailed return precautions discussed.                   Clinical Impression:   Final diagnoses:  [R03.0] Elevated blood pressure reading  [I10] Hypertension, unspecified type (Primary)        ED Disposition Condition    Discharge Stable          ED Prescriptions    None       Follow-up Information    None          Apryl Hernandes, SAMANTHA  04/13/23 1102

## 2023-04-21 ENCOUNTER — OFFICE VISIT (OUTPATIENT)
Dept: FAMILY MEDICINE | Facility: CLINIC | Age: 63
End: 2023-04-21
Payer: COMMERCIAL

## 2023-04-21 VITALS
TEMPERATURE: 98 F | WEIGHT: 167 LBS | DIASTOLIC BLOOD PRESSURE: 88 MMHG | OXYGEN SATURATION: 98 % | HEIGHT: 69 IN | RESPIRATION RATE: 20 BRPM | SYSTOLIC BLOOD PRESSURE: 126 MMHG | HEART RATE: 107 BPM | BODY MASS INDEX: 24.73 KG/M2

## 2023-04-21 DIAGNOSIS — I10 HYPERTENSION, UNSPECIFIED TYPE: Primary | ICD-10-CM

## 2023-04-21 DIAGNOSIS — H57.89 REDNESS OF LEFT EYE: ICD-10-CM

## 2023-04-21 LAB
ALBUMIN SERPL BCP-MCNC: 4.2 G/DL (ref 3.5–5)
ALBUMIN/GLOB SERPL: 1.1 {RATIO}
ALP SERPL-CCNC: 94 U/L (ref 50–130)
ALT SERPL W P-5'-P-CCNC: 28 U/L (ref 13–56)
ANION GAP SERPL CALCULATED.3IONS-SCNC: 5 MMOL/L (ref 7–16)
AST SERPL W P-5'-P-CCNC: 11 U/L (ref 15–37)
BILIRUB SERPL-MCNC: 0.4 MG/DL (ref ?–1.2)
BUN SERPL-MCNC: 24 MG/DL (ref 7–18)
BUN/CREAT SERPL: 24 (ref 6–20)
CALCIUM SERPL-MCNC: 10 MG/DL (ref 8.5–10.1)
CHLORIDE SERPL-SCNC: 103 MMOL/L (ref 98–107)
CO2 SERPL-SCNC: 32 MMOL/L (ref 21–32)
CREAT SERPL-MCNC: 1 MG/DL (ref 0.55–1.02)
EGFR (NO RACE VARIABLE) (RUSH/TITUS): 65 ML/MIN/1.73M²
GLOBULIN SER-MCNC: 3.7 G/DL (ref 2–4)
GLUCOSE SERPL-MCNC: 98 MG/DL (ref 74–106)
POTASSIUM SERPL-SCNC: 4.4 MMOL/L (ref 3.5–5.1)
PROT SERPL-MCNC: 7.9 G/DL (ref 6.4–8.2)
SODIUM SERPL-SCNC: 136 MMOL/L (ref 136–145)

## 2023-04-21 PROCEDURE — 1159F MED LIST DOCD IN RCRD: CPT | Mod: ,,, | Performed by: NURSE PRACTITIONER

## 2023-04-21 PROCEDURE — 1160F PR REVIEW ALL MEDS BY PRESCRIBER/CLIN PHARMACIST DOCUMENTED: ICD-10-PCS | Mod: ,,, | Performed by: NURSE PRACTITIONER

## 2023-04-21 PROCEDURE — 1159F PR MEDICATION LIST DOCUMENTED IN MEDICAL RECORD: ICD-10-PCS | Mod: ,,, | Performed by: NURSE PRACTITIONER

## 2023-04-21 PROCEDURE — 3079F PR MOST RECENT DIASTOLIC BLOOD PRESSURE 80-89 MM HG: ICD-10-PCS | Mod: ,,, | Performed by: NURSE PRACTITIONER

## 2023-04-21 PROCEDURE — 3074F SYST BP LT 130 MM HG: CPT | Mod: ,,, | Performed by: NURSE PRACTITIONER

## 2023-04-21 PROCEDURE — 1160F RVW MEDS BY RX/DR IN RCRD: CPT | Mod: ,,, | Performed by: NURSE PRACTITIONER

## 2023-04-21 PROCEDURE — 99213 OFFICE O/P EST LOW 20 MIN: CPT | Mod: ,,, | Performed by: NURSE PRACTITIONER

## 2023-04-21 PROCEDURE — 3008F PR BODY MASS INDEX (BMI) DOCUMENTED: ICD-10-PCS | Mod: ,,, | Performed by: NURSE PRACTITIONER

## 2023-04-21 PROCEDURE — 3079F DIAST BP 80-89 MM HG: CPT | Mod: ,,, | Performed by: NURSE PRACTITIONER

## 2023-04-21 PROCEDURE — 80053 COMPREHEN METABOLIC PANEL: CPT | Mod: ,,, | Performed by: CLINICAL MEDICAL LABORATORY

## 2023-04-21 PROCEDURE — 3074F PR MOST RECENT SYSTOLIC BLOOD PRESSURE < 130 MM HG: ICD-10-PCS | Mod: ,,, | Performed by: NURSE PRACTITIONER

## 2023-04-21 PROCEDURE — 80053 COMPREHENSIVE METABOLIC PANEL: ICD-10-PCS | Mod: ,,, | Performed by: CLINICAL MEDICAL LABORATORY

## 2023-04-21 PROCEDURE — 99213 PR OFFICE/OUTPT VISIT, EST, LEVL III, 20-29 MIN: ICD-10-PCS | Mod: ,,, | Performed by: NURSE PRACTITIONER

## 2023-04-21 PROCEDURE — 3008F BODY MASS INDEX DOCD: CPT | Mod: ,,, | Performed by: NURSE PRACTITIONER

## 2023-04-21 NOTE — PROGRESS NOTES
Klarissa Infante, SAMANTHA   [facility]      PATIENT NAME: Asia Schwartz  : 1960  DATE: 23  MRN: [id number]      Patient PCP Information       Provider PCP Type    Primary Doctor No General            Reason for Visit / Chief Complaint: Follow-up (Rm 3//Patient presents to the clinic hosp f/u)           History of Present Illness / Problem Focused Workflow     Asia Schwartz presents to the clinic with Follow-up (Rm 3//Patient presents to the clinic hosp f/u)     HPI  Ms Schwartz presents to clinic for hospital follow up for htn.   Patient was placed on norvasc and hctz. Patient states she has been taking, did report some recent muscle cramps  Patient otherwise tolerating medication wo complaints.   Patient was also placed on eye drops (antihistamine and antibiotic)  Patient with continued redness to left eye. Patient with cataract and arcus blue halo to iris. 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   Reports prior eye exam at [facility] Patient states she is due for follow up. Recommended calling to set up appt. Will request records after eval.     Review of Systems     Review of Systems   Constitutional:  Negative for activity change, appetite change, chills, diaphoresis, fatigue, fever and unexpected weight change.   HENT:  Negative for congestion, ear pain, facial swelling, hearing loss, nosebleeds and sore throat.    Eyes:  Positive for redness.   Respiratory:  Negative for apnea, cough, shortness of breath and wheezing.    Cardiovascular:  Negative for chest pain, palpitations and leg swelling.   Gastrointestinal:  Negative for abdominal distention, abdominal pain, blood in stool, constipation, diarrhea and nausea.   Endocrine: Negative for cold intolerance, heat intolerance, polydipsia, polyphagia and polyuria.   Genitourinary:  Negative for decreased urine volume, difficulty urinating, dysuria, flank pain, frequency, hematuria and urgency.    Musculoskeletal:  Positive for myalgias. Negative for arthralgias and joint swelling.   Skin:  Negative for color change and rash.   Neurological:  Negative for dizziness, tremors, seizures, syncope, facial asymmetry, speech difficulty, weakness, light-headedness, numbness and headaches.   Hematological:  Negative for adenopathy. Does not bruise/bleed easily.   Psychiatric/Behavioral:  Negative for behavioral problems and confusion.      Medical / Social / Family History     Past Medical History:   Diagnosis Date    Essential (primary) hypertension     Smokes     Vision impairment     left       Past Surgical History:   Procedure Laterality Date    ECTOPIC PREGNANCY SURGERY         Social History  Ms.  reports that she has been smoking cigarettes. She has been smoking an average of .5 packs per day. She has never used smokeless tobacco. She reports that she does not currently use alcohol. She reports that she does not use drugs.    Family History  Ms.'s family history includes Cancer in her mother and sister; Diabetes in her brother and mother; Hypertension in her brother and mother.    Medications and Allergies     Medications  Outpatient Medications Marked as Taking for the 4/21/23 encounter (Office Visit) with SAMANTHA Khan   Medication Sig Dispense Refill    amLODIPine (NORVASC) 5 MG tablet Take 1 tablet (5 mg total) by mouth once daily. 30 tablet 2    ciprofloxacin HCl (CILOXAN) 0.3 % ophthalmic solution Place 1 drop into the left eye 4 (four) times daily. for 7 days 5 mL 0    clindamycin (CLEOCIN) 300 MG capsule Take 1 capsule (300 mg total) by mouth 4 (four) times daily. for 10 days 40 capsule 0    hydroCHLOROthiazide (HYDRODIURIL) 12.5 MG Tab Take 1 tablet (12.5 mg total) by mouth once daily. 30 tablet 2    olopatadine (PATANOL) 0.1 % ophthalmic solution Place 1 drop into the left eye 2 (two) times daily as needed (itchy eye). 5 mL 0       Allergies  Review of patient's allergies indicates:  No  "Known Allergies    Physical Examination     Vitals:    04/21/23 0738   BP: 126/88   BP Location: Left arm   Patient Position: Sitting   BP Method: Small (Automatic)   Pulse: 107   Resp: 20   Temp: 98 °F (36.7 °C)   TempSrc: Oral   SpO2: 98%   Weight: 75.8 kg (167 lb)   Height: 5' 9" (1.753 m)       Physical Exam  Vitals and nursing note reviewed.   Constitutional:       Appearance: Normal appearance. She is normal weight.   HENT:      Head: Normocephalic.      Right Ear: External ear normal.      Left Ear: External ear normal.      Nose: Nose normal.      Mouth/Throat:      Mouth: Mucous membranes are moist.   Eyes:      Extraocular Movements: Extraocular movements intact.      Conjunctiva/sclera: Conjunctivae normal.      Pupils: Pupils are equal, round, and reactive to light.      Comments: Left eye scleral injected, arcus senilis, cloudy cataract overlay suspected   Cardiovascular:      Rate and Rhythm: Normal rate and regular rhythm.      Pulses: Normal pulses.      Heart sounds: Normal heart sounds. No murmur heard.  Pulmonary:      Effort: Pulmonary effort is normal.      Breath sounds: Normal breath sounds. No stridor. No wheezing or rhonchi.   Abdominal:      General: Bowel sounds are normal. There is no distension.      Palpations: Abdomen is soft. There is no mass.      Tenderness: There is no abdominal tenderness.   Musculoskeletal:         General: No swelling or tenderness. Normal range of motion.      Cervical back: Normal range of motion and neck supple.      Right lower leg: No edema.      Left lower leg: No edema.   Skin:     General: Skin is warm and dry.      Capillary Refill: Capillary refill takes less than 2 seconds.   Neurological:      General: No focal deficit present.      Mental Status: She is alert and oriented to person, place, and time. Mental status is at baseline.      Cranial Nerves: No cranial nerve deficit.      Sensory: No sensory deficit.      Motor: No weakness.   Psychiatric:   "       Mood and Affect: Mood normal.         Behavior: Behavior normal.         Thought Content: Thought content normal.         Judgment: Judgment normal.         Admission on 04/13/2023, Discharged on 04/13/2023   Component Date Value Ref Range Status    Sodium 04/13/2023 143  136 - 145 mmol/L Final    Potassium 04/13/2023 3.6  3.5 - 5.1 mmol/L Final    Chloride 04/13/2023 105  98 - 107 mmol/L Final    CO2 04/13/2023 29  21 - 32 mmol/L Final    Anion Gap 04/13/2023 13  7 - 16 mmol/L Final    Glucose 04/13/2023 96  74 - 106 mg/dL Final    BUN 04/13/2023 9  7 - 18 mg/dL Final    Creatinine 04/13/2023 0.68  0.55 - 1.02 mg/dL Final    BUN/Creatinine Ratio 04/13/2023 13  6 - 20 Final    Calcium 04/13/2023 9.2  8.5 - 10.1 mg/dL Final    Total Protein 04/13/2023 7.8  6.4 - 8.2 g/dL Final    Albumin 04/13/2023 4.0  3.5 - 5.0 g/dL Final    Globulin 04/13/2023 3.8  2.0 - 4.0 g/dL Final    A/G Ratio 04/13/2023 1.1   Final    Bilirubin, Total 04/13/2023 0.3  >0.0 - 1.2 mg/dL Final    Alk Phos 04/13/2023 96  50 - 130 U/L Final    ALT 04/13/2023 36  13 - 56 U/L Final    AST 04/13/2023 21  15 - 37 U/L Final    eGFR 04/13/2023 100  >=60 mL/min/1.73m² Final    Color, UA 04/13/2023 Light Yellow  Colorless, Straw, Yellow, Light Yellow, Dark Yellow Final    Clarity, UA 04/13/2023 Clear  Clear Final    pH, UA 04/13/2023 5.5  5.0 to 8.0 pH Units Final    Leukocytes, UA 04/13/2023 Trace (A)  Negative Final    Nitrites, UA 04/13/2023 Negative  Negative Final    Protein, UA 04/13/2023 Negative  Negative Final    Glucose, UA 04/13/2023 Normal  Normal mg/dL Final    Ketones, UA 04/13/2023 Negative  Negative mg/dL Final    Urobilinogen, UA 04/13/2023 Normal  0.2, 1.0, Normal mg/dL Final    Bilirubin, UA 04/13/2023 Negative  Negative Final    Blood, UA 04/13/2023 Negative  Negative Final    Specific Gravity, UA 04/13/2023 1.015  <=1.030 Final    WBC 04/13/2023 8.69  4.50 - 11.00 K/uL Final    RBC 04/13/2023 5.05  4.20 - 5.40 M/uL Final     Hemoglobin 04/13/2023 13.3  12.0 - 16.0 g/dL Final    Hematocrit 04/13/2023 43.5  38.0 - 47.0 % Final    MCV 04/13/2023 86.1  80.0 - 96.0 fL Final    MCH 04/13/2023 26.3 (L)  27.0 - 31.0 pg Final    MCHC 04/13/2023 30.6 (L)  32.0 - 36.0 g/dL Final    RDW 04/13/2023 15.9 (H)  11.5 - 14.5 % Final    Platelet Count 04/13/2023 310  150 - 400 K/uL Final    MPV 04/13/2023 10.4  9.4 - 12.4 fL Final    Neutrophils % 04/13/2023 49.4 (L)  53.0 - 65.0 % Final    Lymphocytes % 04/13/2023 39.8  27.0 - 41.0 % Final    Monocytes % 04/13/2023 7.9 (H)  2.0 - 6.0 % Final    Eosinophils % 04/13/2023 1.6  1.0 - 4.0 % Final    Basophils % 04/13/2023 0.3  0.0 - 1.0 % Final    Immature Granulocytes % 04/13/2023 1.0 (H)  0.0 - 0.4 % Final    nRBC, Auto 04/13/2023 0.0  <=0.0 % Final    Neutrophils, Abs 04/13/2023 4.28  1.80 - 7.70 K/uL Final    Lymphocytes, Absolute 04/13/2023 3.46  1.00 - 4.80 K/uL Final    Monocytes, Absolute 04/13/2023 0.69  0.00 - 0.80 K/uL Final    Eosinophils, Absolute 04/13/2023 0.14  0.00 - 0.50 K/uL Final    Basophils, Absolute 04/13/2023 0.03  0.00 - 0.20 K/uL Final    Immature Granulocytes, Absolute 04/13/2023 0.09 (H)  0.00 - 0.04 K/uL Final    nRBC, Absolute 04/13/2023 0.00  <=0.00 x10e3/uL Final    Diff Type 04/13/2023 Auto   Final    WBC, UA 04/13/2023 0-5  None Seen, 0-5 /hpf Final    RBC, UA 04/13/2023 0-3  None Seen, 0-3 /hpf Final    Bacteria, UA 04/13/2023 Few (A)  None Seen /hpf Final    Yeast, UA 04/13/2023 None Seen  None Seen /hpf Final    Squamous Epithelial Cells, UA 04/13/2023 Few (A)  None Seen, Rare, None Seen To Occasional /lpf Final    Mucus, UA 04/13/2023 None Seen  None Seen /hpf Final    Trichomonas, UA 04/13/2023 None Seen  None Seen /hpf Final   Office Visit on 04/13/2023   Component Date Value Ref Range Status    SARS Coronavirus 2 Antigen 04/13/2023 Negative  Negative Final     Acceptable 04/13/2023 Yes   Final    POC Glucose 04/13/2023 106  70 - 110 MG/DL Final              Assessment and Plan (including Health Maintenance)       Plan:   Hypertension, unspecified type  Comments:  -continue norvasc and hctz, will follow up with potassium level  -home bp monitoring  -follow up for wellness next month  Orders:  -     Comprehensive Metabolic Panel; Future; Expected date: 04/21/2023    Redness of left eye  Comments:  -continue antibiotc drops as prescribed  -dexamethsone added  -antihistamine gtt prev ordered  -     dexAMETHasone (DECADRON) 0.1 % DrpS; Place 2 drops into the left eye every 12 (twelve) hours. for 5 days  Dispense: 5 mL; Refill: 0     Follow up for wellness  There are no Patient Instructions on file for this visit.       Health Maintenance Due   Topic Date Due    Hepatitis C Screening  Never done    Cervical Cancer Screening  Never done    Lipid Panel  Never done    Pneumococcal Vaccines (Age 0-64) (1 - PCV) Never done    HIV Screening  Never done    TETANUS VACCINE  Never done    Mammogram  Never done    Colorectal Cancer Screening  Never done    Shingles Vaccine (1 of 2) Never done    COVID-19 Vaccine (3 - Booster for Moderna series) 06/15/2021    Influenza Vaccine (1) Never done       Most Recent Immunizations   Administered Date(s) Administered    COVID-19, MRNA, LN-S, PF (MODERNA FULL 0.5 ML DOSE) 04/20/2021        Problem List Items Addressed This Visit    None  Visit Diagnoses       Hypertension, unspecified type   (Well controlled)  -  Primary    -continue norvasc and hctz, will follow up with potassium level  -home bp monitoring  -follow up for wellness next month    Relevant Orders    Comprehensive Metabolic Panel    Redness of left eye        -continue antibiotc drops as prescribed  -dexamethsone added  -antihistamine gtt prev ordered    Relevant Medications    dexAMETHasone (DECADRON) 0.1 % DrpS            The patient has no Health Maintenance topics of status Not Due    Future Appointments   Date Time Provider Department Center   5/24/2023  9:15 AM Klarissa JAMISON  SAMANTHA Infante [facility] SARY Batista            Signature:  SAMANTHA Khan  [facility]     Date of encounter: 4/21/23

## 2023-05-08 LAB
LEFT EYE DM RETINOPATHY: NEGATIVE
RIGHT EYE DM RETINOPATHY: NEGATIVE

## 2023-05-15 PROBLEM — J01.00 ACUTE NON-RECURRENT MAXILLARY SINUSITIS: Status: RESOLVED | Noted: 2023-02-08 | Resolved: 2023-05-15

## 2023-05-24 ENCOUNTER — OFFICE VISIT (OUTPATIENT)
Dept: FAMILY MEDICINE | Facility: CLINIC | Age: 63
End: 2023-05-24
Payer: COMMERCIAL

## 2023-05-24 VITALS
HEIGHT: 69 IN | DIASTOLIC BLOOD PRESSURE: 75 MMHG | WEIGHT: 166 LBS | HEART RATE: 93 BPM | TEMPERATURE: 99 F | SYSTOLIC BLOOD PRESSURE: 109 MMHG | RESPIRATION RATE: 20 BRPM | BODY MASS INDEX: 24.59 KG/M2 | OXYGEN SATURATION: 95 %

## 2023-05-24 DIAGNOSIS — Z13.220 SCREENING FOR LIPOID DISORDERS: ICD-10-CM

## 2023-05-24 DIAGNOSIS — Z00.00 ROUTINE GENERAL MEDICAL EXAMINATION AT A HEALTH CARE FACILITY: Primary | ICD-10-CM

## 2023-05-24 DIAGNOSIS — Z11.4 SCREENING FOR HIV (HUMAN IMMUNODEFICIENCY VIRUS): ICD-10-CM

## 2023-05-24 DIAGNOSIS — Z12.11 SCREENING FOR COLON CANCER: ICD-10-CM

## 2023-05-24 DIAGNOSIS — Z13.1 SCREENING FOR DIABETES MELLITUS: ICD-10-CM

## 2023-05-24 DIAGNOSIS — Z11.59 NEED FOR HEPATITIS C SCREENING TEST: ICD-10-CM

## 2023-05-24 LAB
ANION GAP SERPL CALCULATED.3IONS-SCNC: 7 MMOL/L (ref 7–16)
BUN SERPL-MCNC: 25 MG/DL (ref 7–18)
BUN/CREAT SERPL: 25 (ref 6–20)
CALCIUM SERPL-MCNC: 9.3 MG/DL (ref 8.5–10.1)
CHLORIDE SERPL-SCNC: 105 MMOL/L (ref 98–107)
CHOLEST SERPL-MCNC: 151 MG/DL (ref 0–200)
CHOLEST/HDLC SERPL: 3.8 {RATIO}
CO2 SERPL-SCNC: 28 MMOL/L (ref 21–32)
CREAT SERPL-MCNC: 1 MG/DL (ref 0.55–1.02)
EGFR (NO RACE VARIABLE) (RUSH/TITUS): 65 ML/MIN/1.73M2
GLUCOSE SERPL-MCNC: 97 MG/DL (ref 74–106)
HCV AB SER QL: NORMAL
HDLC SERPL-MCNC: 40 MG/DL (ref 40–60)
HIV 1+O+2 AB SERPL QL: NORMAL
LDLC SERPL CALC-MCNC: 90 MG/DL
LDLC/HDLC SERPL: 2.3 {RATIO}
NONHDLC SERPL-MCNC: 111 MG/DL
POTASSIUM SERPL-SCNC: 3.6 MMOL/L (ref 3.5–5.1)
SODIUM SERPL-SCNC: 136 MMOL/L (ref 136–145)
TRIGL SERPL-MCNC: 105 MG/DL (ref 35–150)
VLDLC SERPL-MCNC: 21 MG/DL

## 2023-05-24 PROCEDURE — 3078F DIAST BP <80 MM HG: CPT | Mod: ,,, | Performed by: NURSE PRACTITIONER

## 2023-05-24 PROCEDURE — 3074F PR MOST RECENT SYSTOLIC BLOOD PRESSURE < 130 MM HG: ICD-10-PCS | Mod: ,,, | Performed by: NURSE PRACTITIONER

## 2023-05-24 PROCEDURE — 3074F SYST BP LT 130 MM HG: CPT | Mod: ,,, | Performed by: NURSE PRACTITIONER

## 2023-05-24 PROCEDURE — 1160F PR REVIEW ALL MEDS BY PRESCRIBER/CLIN PHARMACIST DOCUMENTED: ICD-10-PCS | Mod: ,,, | Performed by: NURSE PRACTITIONER

## 2023-05-24 PROCEDURE — 1159F MED LIST DOCD IN RCRD: CPT | Mod: ,,, | Performed by: NURSE PRACTITIONER

## 2023-05-24 PROCEDURE — 80048 BASIC METABOLIC PANEL: ICD-10-PCS | Mod: ,,, | Performed by: CLINICAL MEDICAL LABORATORY

## 2023-05-24 PROCEDURE — 80061 LIPID PANEL: ICD-10-PCS | Mod: ,,, | Performed by: CLINICAL MEDICAL LABORATORY

## 2023-05-24 PROCEDURE — 3008F PR BODY MASS INDEX (BMI) DOCUMENTED: ICD-10-PCS | Mod: ,,, | Performed by: NURSE PRACTITIONER

## 2023-05-24 PROCEDURE — 87389 HIV-1 AG W/HIV-1&-2 AB AG IA: CPT | Mod: ,,, | Performed by: CLINICAL MEDICAL LABORATORY

## 2023-05-24 PROCEDURE — 86803 HEPATITIS C AB TEST: CPT | Mod: ,,, | Performed by: CLINICAL MEDICAL LABORATORY

## 2023-05-24 PROCEDURE — 1159F PR MEDICATION LIST DOCUMENTED IN MEDICAL RECORD: ICD-10-PCS | Mod: ,,, | Performed by: NURSE PRACTITIONER

## 2023-05-24 PROCEDURE — 87389 HIV 1 / 2 ANTIBODY: ICD-10-PCS | Mod: ,,, | Performed by: CLINICAL MEDICAL LABORATORY

## 2023-05-24 PROCEDURE — 80061 LIPID PANEL: CPT | Mod: ,,, | Performed by: CLINICAL MEDICAL LABORATORY

## 2023-05-24 PROCEDURE — 99396 PREV VISIT EST AGE 40-64: CPT | Mod: ,,, | Performed by: NURSE PRACTITIONER

## 2023-05-24 PROCEDURE — 3008F BODY MASS INDEX DOCD: CPT | Mod: ,,, | Performed by: NURSE PRACTITIONER

## 2023-05-24 PROCEDURE — 80048 BASIC METABOLIC PNL TOTAL CA: CPT | Mod: ,,, | Performed by: CLINICAL MEDICAL LABORATORY

## 2023-05-24 PROCEDURE — 86803 HEPATITIS C ANTIBODY: ICD-10-PCS | Mod: ,,, | Performed by: CLINICAL MEDICAL LABORATORY

## 2023-05-24 PROCEDURE — 1160F RVW MEDS BY RX/DR IN RCRD: CPT | Mod: ,,, | Performed by: NURSE PRACTITIONER

## 2023-05-24 PROCEDURE — 3078F PR MOST RECENT DIASTOLIC BLOOD PRESSURE < 80 MM HG: ICD-10-PCS | Mod: ,,, | Performed by: NURSE PRACTITIONER

## 2023-05-24 PROCEDURE — 99396 PR PREVENTIVE VISIT,EST,40-64: ICD-10-PCS | Mod: ,,, | Performed by: NURSE PRACTITIONER

## 2023-05-24 RX ORDER — BRIMONIDINE TARTRATE AND TIMOLOL MALEATE 2; 5 MG/ML; MG/ML
1 SOLUTION OPHTHALMIC 2 TIMES DAILY
COMMUNITY
Start: 2023-05-08

## 2023-05-24 NOTE — Clinical Note
Patient reports mammogram at [facility] Oct or Nov 2022. Please obtain records. Thanks Klarissa

## 2023-05-24 NOTE — PROGRESS NOTES
Subjective     Patient ID: Asia Schwartz is a 62 y.o. female.    Chief Complaint: Healthy You (Room 1)    HPI      Asia Schwartz is a 62 y.o. female who presents for routine wellness visit. Pt states she is doing well with no acute complaints. Sister with breast cancer, sister with ovarian/cervical cancer. Mother had colon cancer. Last colonoscopy was never with 45 year old rec, referral placed.  Last mammogram was Nov 2022, [place], records to be requested. Last pap was 2-3 years ago. Believes at [facility]. Records to be requested, possibly in [facility]. Declines flu shot. Smoker. Weaning down. Declines referral for cessation counseling. Working on improving diet and exercise.  Advised to quit smoking  Impact of smoking on health discussed  Discussed method and skills for cessation      Review of Systems   Constitutional: Negative.    HENT: Negative.     Eyes: Negative.    Respiratory: Negative.     Cardiovascular: Negative.    Gastrointestinal: Negative.    Endocrine: Negative.    Genitourinary: Negative.    Musculoskeletal: Negative.    Allergic/Immunologic: Negative.    Neurological: Negative.    Hematological: Negative.    Psychiatric/Behavioral: Negative.       Tobacco Use: High Risk    Smoking Tobacco Use: Every Day    Smokeless Tobacco Use: Never    Passive Exposure: Not on file     Review of patient's allergies indicates:  No Known Allergies  Current Outpatient Medications   Medication Instructions    amLODIPine (NORVASC) 5 mg, Oral, Daily    brimonidine-timoloL (COMBIGAN) 0.2-0.5 % Drop 1 drop, Left Eye, 2 times daily    hydroCHLOROthiazide (HYDRODIURIL) 12.5 mg, Oral, Daily     Medications Discontinued During This Encounter   Medication Reason    olopatadine (PATANOL) 0.1 % ophthalmic solution Patient no longer taking       Past Medical History:   Diagnosis Date    Essential (primary) hypertension     Smokes     Vision impairment     left     Health Maintenance Topics with due status: Not  "Due       Topic Last Completion Date    Influenza Vaccine Not Due     Immunization History   Administered Date(s) Administered    COVID-19 Vaccine 12/01/2021, 07/15/2022    COVID-19, MRNA, LN-S, PF (MODERNA FULL 0.5 ML DOSE) 03/22/2021, 04/20/2021       Objective     Body mass index is 24.51 kg/m².  Wt Readings from Last 3 Encounters:   05/24/23 75.3 kg (166 lb)   04/21/23 75.8 kg (167 lb)   04/13/23 78.5 kg (173 lb)     Ht Readings from Last 3 Encounters:   05/24/23 5' 9" (1.753 m)   04/21/23 5' 9" (1.753 m)   04/13/23 5' 9" (1.753 m)     BP Readings from Last 3 Encounters:   05/24/23 109/75   04/21/23 126/88   04/13/23 (!) 189/101     Temp Readings from Last 3 Encounters:   05/24/23 98.7 °F (37.1 °C) (Oral)   04/21/23 98 °F (36.7 °C) (Oral)   04/13/23 99 °F (37.2 °C) (Oral)     Pulse Readings from Last 3 Encounters:   05/24/23 93   04/21/23 107   04/13/23 90     Resp Readings from Last 3 Encounters:   05/24/23 20   04/21/23 20   04/13/23 18     PF Readings from Last 3 Encounters:   No data found for PF     Over the last two weeks how often have you been bothered by little interest or pleasure in doing things: 0  Over the last two weeks how often have you been bothered by feeling down, depressed or hopeless: 0  PHQ-2 Total Score: 0  PHQ-9 Score: 0  PHQ-9 Interpretation: Minimal or None      Physical Exam  Vitals and nursing note reviewed.   Constitutional:       Appearance: Normal appearance. She is normal weight.   HENT:      Head: Normocephalic.      Right Ear: Tympanic membrane, ear canal and external ear normal.      Left Ear: Tympanic membrane, ear canal and external ear normal.      Nose: Nose normal.      Mouth/Throat:      Mouth: Mucous membranes are moist.   Eyes:      Extraocular Movements: Extraocular movements intact.      Conjunctiva/sclera: Conjunctivae normal.      Pupils: Pupils are equal, round, and reactive to light.   Cardiovascular:      Rate and Rhythm: Normal rate and regular rhythm.      " Pulses: Normal pulses.      Heart sounds: Normal heart sounds. No murmur heard.  Pulmonary:      Effort: Pulmonary effort is normal.      Breath sounds: Normal breath sounds. No stridor. No wheezing or rhonchi.   Abdominal:      General: Bowel sounds are normal. There is no distension.      Palpations: Abdomen is soft. There is no mass.      Tenderness: There is no abdominal tenderness.   Musculoskeletal:         General: No swelling or tenderness. Normal range of motion.      Cervical back: Normal range of motion and neck supple.      Right lower leg: No edema.      Left lower leg: No edema.   Skin:     General: Skin is warm and dry.      Capillary Refill: Capillary refill takes less than 2 seconds.   Neurological:      General: No focal deficit present.      Mental Status: She is alert and oriented to person, place, and time. Mental status is at baseline.      Cranial Nerves: No cranial nerve deficit.      Sensory: No sensory deficit.      Motor: No weakness.   Psychiatric:         Mood and Affect: Mood normal.         Behavior: Behavior normal.         Thought Content: Thought content normal.         Judgment: Judgment normal.       Assessment and Plan     Problem List Items Addressed This Visit    None  Visit Diagnoses       Routine general medical examination at a health care facility    -  Primary    Relevant Orders    Lipid Panel    Ambulatory referral/consult to Gastroenterology    Screening for diabetes mellitus        Relevant Orders    Basic Metabolic Panel    Screening for lipoid disorders        Relevant Orders    Lipid Panel    Need for hepatitis C screening test        Relevant Orders    Hepatitis C Antibody    Screening for HIV (human immunodeficiency virus)        Relevant Orders    HIV 1/2 Ag/Ab (4th Gen)    Screening for colon cancer        Relevant Orders    Ambulatory referral/consult to Gastroenterology              Plan: Advised to quit smoking  Impact of smoking on health discussed  Discussed  method and skills for cessation  Gi referral for colonoscopy    Nonfasting labs pending     I have reviewed the medications, allergies, and problem list.     Goal Actions:    What type of visit is the patient here for today?: Healthy You  Does the patient consent to enroll in Color Me Healthy?: No  Is this a Wellness Follow Up?: No  What is your overall wellness goal? (select at least one): Improve overall health  Choose 3: Exercise, Nutrition, Lifestyle, Tobacco  Lifestyle Actions : Obtain yearly mammogram, Pap smear or HPV, Develop good support system, Take medications as prescribed, Schedule colonoscopy, sigmoidoscopy, or Colorguard if applicable  Nurtrition Actions: Avoid adding table salt, drink 8-10 glasses of water per day, Avoid carbonated beverages, Eat a well-balanced diet, Eat heart healthy diet, Decrease intake of fast food  Exercise Actions: Recommend physical activity 30 minutes per day 3-5 times/week  Tobacco Actions: Avoid all tobacco products including 2nd and 3rd hand exposure, Refer for tobacco free if part of plan, Patient will not stop using tobacco recommend reducing amt of consumption per day        Total time spent face-to-face and non-face-to-face coordinating care for this encounter was: >30 min    Chronic conditions status updated as per HPI.  Other than changes above, cont current medications and maintain follow up with specialists.  Return to clinic 1 year next wellness, 3 mo htn follow up.    Klarissa Infante [facility]

## 2023-05-24 NOTE — Clinical Note
Please check Tamica for pap . Believe she had done at [facility] 2-3 years ago. Not showing in Epic.

## 2023-05-24 NOTE — PATIENT INSTRUCTIONS
Patient Education       Smoking: Not Just Harmful to Your Lungs and Heart   About this topic   Smoking is very common at any age. It is one of the leading causes of poor health and illness. Smoking can lead to death. It has many harmful chemicals that are released by the tobacco you smoke and inhale. Tobacco has many forms. These are:  Cigarettes  Pipes  Cigars  Chewing tobacco  Electronic cigarettes  Loose  Hookah  All kinds of tobaccos contain many toxic substances. These are what make you sick from smoking.  Nicotine - The main thing that makes you addicted to smoking  Carbon monoxide - A poison that gets into your blood when smoking  Tar - Has chemicals that are cancerous  Lead and many others  These factors affect how much damage smoking will have on you:  How much you smoke  What you smoke  How what you smoke has been prepared  The content of what you smoke  Smoking hurts every part of the body. The lungs and the heart are most often affected by tobacco use.  General   Smoking is very harmful to your body. It can cause many illnesses and can affect how you look.  Smoking and Cancer   Smoking is the most common cause of lung cancer. Smoking may also increase the risk of:  Mouth cancer. This can also be caused by chewing tobacco.  Bladder cancer  Cancer of the tube from the mouth to stomach. This is also called the esophagus.  Cancer of the throat. This can also be caused by chewing tobacco.  Kidney cancer  Liver cancer  Stomach, colon, and rectal cancer  Cancer of the pancreas  Cancer of the cervix in women  Cancer of the blood or leukemia  Skin cancer  Smoking and Your Lungs   If you smoke you are more likely to have:  Breathing problems  Lung infections like pneumonia or bronchitis  Lung disease such as COPD or emphysema  Asthma  Smoking and the Heart and Circulation   Causes heart disease and stroke  Smokers are at a higher risk for high blood pressure  You are more likely to get blood clots  Smoking can  lower blood flow to the legs and skin  Smoking and Diabetes   If you smoke, you:  Have a higher risk of developing diabetes  May have higher blood sugar levels  May have more problems with your diabetes  Smoking and Digestion   Smokers make more stomach acid. This can cause sores or ulcers in your belly or bowel.  Your stomach acids may flow back to your esophagus. This is also called heartburn.  You have more chance of bowel irritation and swelling  Smoking and Your Bones   If you smoke:  Your bone-forming cells don't grow as fast  Your bones may become weaker (osteoporosis)  You may have more low back pain and arthritis  You may have more overuse injuries  You may have a greater risk of breaking bones  Your broken bones may take longer to heal  Smoking and Pregnancy   Makes your baby more prone to problems  You have a higher chance of having a premature baby or baby born early  Your healthy baby may die without reason. This is called sudden infant death syndrome.  Your baby may be born dead (stillbirth)  Your baby may have a low birth weight  You have a higher risk of an ectopic pregnancy or a pregnancy outside of the uterus  You have a higher chance of having a baby with mouth or facial defects  Smoking and Your Eyes, Hair, Hands, and Mouth   If you smoke, you may notice your:  Eyes become cloudy and form cataracts  Hair may get thin and turn gray sooner than it should  Fingernails turn yellow  Teeth become yellowish brown  Gums have more problems  Teeth have problems or even become loose  Sense of taste and smell start to go away  Breath smells bad  Smoking and Skin   Smoking causes:  Less blood flow to the skin  Wrinkles start early around your eyes and mouth  Dry skin  Your skin to lose elasticity and strength  Skin may get splotchy or pale  Your face to look thin and old. This is called smoker's face.  Greater risk of getting a skin problem called psoriasis  Slower healing of cuts or bruises  Smoking and Sex  Life   If you smoke you are more likely to have problems like:  Impotence  Decreased blood flow to the penis. This is also called erectile dysfunction.  Trouble getting pregnant  Early change of life or menopause for women  A higher risk of heart attack or stroke for women who smoke and use birth control pills  Damaged sperm that may lead to problems getting a woman pregnant, birth defects, or miscarriage  Smoking and Your Brain and Behavior   Smoking can:  Affect your mood  Lead to addiction  Cause long-term confusion or damage to your brain cells  Cause low mood  Smoking and Children   If you smoke, your children:  Will be more likely to smoke.  Can be sick from being around your smoke. This is called secondhand smoke.  Need to learn about the bad effects of smoking. Most smokers start before the age of 18. Encourage your children never to smoke.  Smoking and Other Effects   Smoking can cause:  Wounds to take longer to heal  You to eat poorly  Weight loss  Swelling in the body and affect the body's immune or defense system  Rheumatoid arthritis  Greater chance of injury  You to get warts easier (human papillomavirus)  A bad odor in hair and on clothes  You to have poor sports performance  You to spend a lot of money. Smoking is an expensive habit. A smoker who smokes a pack per day in the [place] will spend over $2000 per year on cigarettes.  Health care to cost more  You to miss work more often  Hearing loss  Even if you have smoked for many years, it is not too late to quit. Your body starts to heal as soon as you stop smoking. It is better to quit when you are young, but you can still get healthier if you quit at any age.       Helpful tips   Some helpful steps you can take to help you quit smoking:  Set a date to quit smoking.  Know the reasons that make you smoke more.  Know why you want to quit smoking.  Write down each time you smoke. Include the time and what you are doing. Plan ahead about what you will do  instead of smoking when that time or event reappears.  Tell your family and friends about your plan to quit smoking. Let them know how to help you.  Slowly reduce your smoking.  Remove smoking and tobacco products from your home and other places.  Avoid places and situations where you will more likely smoke. If people close to you smoke, ask them to quit with you. If they do not quit, ask them to not smoke around you.  Reward or treat yourself every time you do not smoke. Do not use food as a reward.  Ask a doctor for help.  Talk with your doctor before you try an electronic cigarette.  Where can I learn more?   Centers for Disease Control and Prevention  http://www.cdc.gov/tobacco/data_statistics/fact_sheets/health_effects/effects_cig_smoking/#overview   Centers for Disease Control and Prevention  https://www.cdc.gov/tobacco/campaign/tips/quit-smoking/guide/quit-plan.html?s_cid=OSH_tips_D9400   KidsHealth  http://[employer].org/teen/drug_alcohol/tobacco/smoking.html#   US Food and Drug Administration  https://www.fda.gov/TobaccoProducts/Labeling/ProductsIngredientsComponents/default.htm   Last Reviewed Date   2021-03-31  Consumer Information Use and Disclaimer   This information is not specific medical advice and does not replace information you receive from your health care provider. This is only a brief summary of general information. It does NOT include all information about conditions, illnesses, injuries, tests, procedures, treatments, therapies, discharge instructions or life-style choices that may apply to you. You must talk with your health care provider for complete information about your health and treatment options. This information should not be used to decide whether or not to accept your health care providers advice, instructions or recommendations. Only your health care provider has the knowledge and training to provide advice that is right for you.  Copyright   Copyright © 2021 [employer] and  its affiliates and/or licensors. All rights reserved.    Patient Education       Quitting Smoking for Older Adults   About this topic   Most of us know that smoking can cause problems to our health. Even if you know that it is bad, you continue smoking. It is because when you start to smoke it is hard to give up. You can become addicted to smoking.  Smoking is very harmful to your health. It can cause many illnesses and can affect how you look. The longer you smoke, the greater the effects on your health. It is never too late to try to quit.  As you stop smoking, it is normal to have signs of withdrawal. These will lessen over time. You may have signs like:  Trouble sleeping  Feeling more hungry  Weight gain  Hard stools  Dizziness  Sore throat or cough  Being irritable  Being anxious or restless  Getting frustrated or angry  Trouble thinking clearly  Low mood  Certain medicines given to you by your regular doctor can help improve these symptoms.  General   Older smokers are at high risk from smoking. The longer you have smoked, the more toxins you have been exposed to. Toxins are the bad chemicals in the tobacco. As someone who has smoked for a long time, you are more likely to have illnesses related to smoking.  It is never too late to quit smoking. It helps your health even at an older age. You will begin to notice changes soon after you stop smoking. Here are some steps you can take to help you quit smoking:  Set a date to quit smoking.  Tell your family and friends about your plan to quit smoking. Let them know how to help with your plan.  Plan ahead about what you will do instead of smoking when you crave a cigarette.  Remove cigarettes from your home, car, and work.  You may want to talk with a counselor to help you learn about:  What triggers you to smoke.  How to resist cravings.  Things to raise your chance of quitting smoking for good.  Counseling can be in person, over the phone, in a group, online, or  through text messages.  Talk with your regular doctor about medicines to help you stop smoking.  Exercise regularly. This may help to lower stress. Going for a walk is good exercise.  Keep your mouth busy with sugar-free candy or gum.  Stay away from people and places that make you want to smoke. If others close to you smoke, ask them to quit with you or to not smoke around you.     What will the results be?   When you start to quit:  Blood flow improves right away.  Your lungs become more active.  Heart rate and blood pressure lower.  You have less chance of having a heart attack.  You will help others be healthy since they are not around secondhand smoke.  After a few days to weeks:  Food tastes and smells better.  Your lungs begin to work better.  Breathing becomes easier.  After a few weeks to months:  You have more energy.  Lungs become clear and work better.  You are less likely to get colds and other lung infections.  Shortness of breath decreases.  Clogging of sinuses decreases.  When you have fully stopped smoking, after a while you will:  Lower the risk of lung cancer  Lower the risk of cancer of the mouth, esophagus, voice box, bladder, pancreas, cervix, and kidney  Lower the risk of heart illnesses like stroke and heart attack  Preserve your eyesight and the look of your teeth and aging skin  Lessen the risk for having type 2 diabetes  Reverse bone loss  Lessen the risk of postoperative problems  Lessen the risk of peptic ulcer and improve healing if ulcer is already there  Help others be healthy since they are not around secondhand smoke  Secondhand smoke:  It is important to know that smoking does not just affect you, it affects everyone around you.  It can cause cancer and heart disease in nonsmokers.  It is more dangerous for babies, children, and pregnant women.  It causes many of the same health problems in others, including your family and friends.  The only way to stop secondhand smoke is to quit  smoking. It is also important to avoid places where you and others are around people who smoke.  What lifestyle changes are needed?   Thoroughly wash and remove all ashtrays from your home and office.  Watch your eating habits and avoid gaining weight. Eat healthy foods and snacks to keep a healthy weight  Keep your mouth busy with sugar free candy and gum.  Change your daily routine. Try to change things like eat breakfast at a different place or drink tea instead of coffee.  Try to relax. Listen to music, meditate, do yoga or breathing exercises, take a relaxing bath or hot shower.  Control your thoughts and emotions. Write or record a journal, create new hobbies, and think positive.  Connect with family and friends. Talk to a friend, get a pet, share your problems with family members, and participate in your community.  What drugs may be needed?   The doctor may order drugs to:  Help with withdrawal signs  Reduce your urges to smoke  Gums, lozenges, and skin patches may be given as a substitute for tobacco.  Will there be any other care needed?   It helps to have other people to support you when you are trying to quit smoking. Talk to your family and friends about how they can best help you. You may also want to think about support groups or counseling.  When do I need to call the doctor?   You have signs of low mood or depression like:  Feeling sad, down, hopeless, or cranky most of the day, almost every day.  Losing or gaining weight without trying to do so.  Sleeping too much or too little.  Feeling tired or like you have no energy.  Acting restless or having trouble staying still.  Helpful tips   Commit yourself to stop smoking.  Focus on your goals and work to achieve them.  Talk with your doctor if you are thinking about switching to an electronic cigarette.  Where can I learn more?   American Cancer Society  https://www.cancer.org/latest-news/cjsml-fim-yjlu-to-quit-smoking.html   American Lung  Association  http://www.lung.org/stop-smoking/m-nhfb-lc-quit/   Centers for Disease Control and Prevention  http://www.cdc.gov/tobacco/campaign/tips/   Last Reviewed Date   2021-06-08  Consumer Information Use and Disclaimer   This information is not specific medical advice and does not replace information you receive from your health care provider. This is only a brief summary of general information. It does NOT include all information about conditions, illnesses, injuries, tests, procedures, treatments, therapies, discharge instructions or life-style choices that may apply to you. You must talk with your health care provider for complete information about your health and treatment options. This information should not be used to decide whether or not to accept your health care providers advice, instructions or recommendations. Only your health care provider has the knowledge and training to provide advice that is right for you.  Copyright   Copyright © 2021 [employer] and its affiliates and/or licensors. All rights reserved.

## 2023-05-25 ENCOUNTER — PATIENT OUTREACH (OUTPATIENT)
Dept: ADMINISTRATIVE | Facility: HOSPITAL | Age: 63
End: 2023-05-25

## 2023-05-25 NOTE — PROGRESS NOTES
Post visit Population Health review of encounter with date of service  5/24 with Naresh.  All required HY components in encounter.    Added myself to tommy

## 2023-06-05 ENCOUNTER — PATIENT OUTREACH (OUTPATIENT)
Dept: ADMINISTRATIVE | Facility: HOSPITAL | Age: 63
End: 2023-06-05

## 2023-06-05 NOTE — LETTER
AUTHORIZATION FOR RELEASE OF   CONFIDENTIAL INFORMATION    Dear [facility],    We are seeing Asia Schwartz, date of birth 1960, in the clinic at [facility]. SAMANTHA Khan is the patient's PCP. Asia Schwartz has an outstanding lab/procedure at the time we reviewed her chart. In order to help keep her health information updated, she has authorized us to request the following medical record(s):        (  )  MAMMOGRAM                                      (  )  COLONOSCOPY      (  )  PAP SMEAR                                          (  )  OUTSIDE LAB RESULTS     (  )  DEXA SCAN                                          ( x )  EYE EXAM            (  )  FOOT EXAM                                          (  )  ENTIRE RECORD     (  )  OUTSIDE IMMUNIZATIONS                 (  )  _______________         Please fax records to [facility], Melissa P Rogers, FNP, [phone].     If you have any questions, please contact Pardeep Lyle at .           Patient Name: Asia Schwartz  : 1960  Patient Phone #: [phone]

## 2023-06-05 NOTE — LETTER
AUTHORIZATION FOR RELEASE OF   CONFIDENTIAL INFORMATION    Dear [facility],    We are seeing Asia Schwartz, date of birth 1960, in the clinic at [facility]. SAMANTHA Khan is the patient's PCP. Asia Schwartz has an outstanding lab/procedure at the time we reviewed her chart. In order to help keep her health information updated, she has authorized us to request the following medical record(s):        ( x )  MAMMOGRAM                                      (  )  COLONOSCOPY      (  )  PAP SMEAR                                          (  )  OUTSIDE LAB RESULTS     (  )  DEXA SCAN                                          (  )  EYE EXAM            (  )  FOOT EXAM                                          (  )  ENTIRE RECORD     (  )  OUTSIDE IMMUNIZATIONS                 (  )  _______________         Please fax records to [facility], Melissa P Rogers, FNP, [phone].     If you have any questions, please contact Pardeep Lyle at .           Patient Name: Asia Schwartz  : 1960  Patient Phone #: [phone]

## 2023-06-05 NOTE — PROGRESS NOTES
ANGELA faxed to [facility] for [facility] exam.   No documentation found in [facility], [employer], [employer], Department of Health and Human Services, or Care Everywhere for pap smear.

## 2023-07-10 ENCOUNTER — PATIENT OUTREACH (OUTPATIENT)
Dept: ADMINISTRATIVE | Facility: HOSPITAL | Age: 63
End: 2023-07-10

## 2023-07-10 NOTE — PROGRESS NOTES
No documentation found in [facility], [employer], or Care Everywhere for c-scope. Pt was referred for this in May 2023 but unable to schedule d/t pt did not answer the phone. Comment placed in chart and upcoming appt on 8/24/23 that pt needs this.

## 2023-08-24 ENCOUNTER — OFFICE VISIT (OUTPATIENT)
Dept: FAMILY MEDICINE | Facility: CLINIC | Age: 63
End: 2023-08-24
Payer: COMMERCIAL

## 2023-08-24 ENCOUNTER — TELEPHONE (OUTPATIENT)
Dept: GASTROENTEROLOGY | Facility: CLINIC | Age: 63
End: 2023-08-24
Payer: COMMERCIAL

## 2023-08-24 VITALS
OXYGEN SATURATION: 98 % | DIASTOLIC BLOOD PRESSURE: 89 MMHG | RESPIRATION RATE: 20 BRPM | SYSTOLIC BLOOD PRESSURE: 135 MMHG | HEART RATE: 95 BPM | HEIGHT: 69 IN | TEMPERATURE: 99 F | BODY MASS INDEX: 25.33 KG/M2 | WEIGHT: 171 LBS

## 2023-08-24 DIAGNOSIS — Z12.11 SCREEN FOR COLON CANCER: ICD-10-CM

## 2023-08-24 DIAGNOSIS — Z12.11 SCREENING FOR COLON CANCER: Primary | ICD-10-CM

## 2023-08-24 DIAGNOSIS — M19.049 ARTHRITIS OF HAND: ICD-10-CM

## 2023-08-24 DIAGNOSIS — I10 ESSENTIAL HYPERTENSION: Primary | ICD-10-CM

## 2023-08-24 LAB
ERYTHROCYTE [SEDIMENTATION RATE] IN BLOOD BY WESTERGREN METHOD: 2 MM/HR (ref 0–30)
RHEUMATOID FACT SER NEPH-ACNC: NEGATIVE [IU]/ML
URATE SERPL-MCNC: 3.9 MG/DL (ref 2.6–6)

## 2023-08-24 PROCEDURE — 86430 RHEUMATOID FACTOR TEST QUAL: CPT | Mod: ,,, | Performed by: CLINICAL MEDICAL LABORATORY

## 2023-08-24 PROCEDURE — 86430 RHEUMATOID FACTOR SCREEN: ICD-10-PCS | Mod: ,,, | Performed by: CLINICAL MEDICAL LABORATORY

## 2023-08-24 PROCEDURE — 3008F PR BODY MASS INDEX (BMI) DOCUMENTED: ICD-10-PCS | Mod: ,,, | Performed by: NURSE PRACTITIONER

## 2023-08-24 PROCEDURE — 3075F SYST BP GE 130 - 139MM HG: CPT | Mod: ,,, | Performed by: NURSE PRACTITIONER

## 2023-08-24 PROCEDURE — 3075F PR MOST RECENT SYSTOLIC BLOOD PRESS GE 130-139MM HG: ICD-10-PCS | Mod: ,,, | Performed by: NURSE PRACTITIONER

## 2023-08-24 PROCEDURE — 1160F RVW MEDS BY RX/DR IN RCRD: CPT | Mod: ,,, | Performed by: NURSE PRACTITIONER

## 2023-08-24 PROCEDURE — 84550 ASSAY OF BLOOD/URIC ACID: CPT | Mod: ,,, | Performed by: CLINICAL MEDICAL LABORATORY

## 2023-08-24 PROCEDURE — 85651 RBC SED RATE NONAUTOMATED: CPT | Mod: ,,, | Performed by: CLINICAL MEDICAL LABORATORY

## 2023-08-24 PROCEDURE — 3079F PR MOST RECENT DIASTOLIC BLOOD PRESSURE 80-89 MM HG: ICD-10-PCS | Mod: ,,, | Performed by: NURSE PRACTITIONER

## 2023-08-24 PROCEDURE — 1160F PR REVIEW ALL MEDS BY PRESCRIBER/CLIN PHARMACIST DOCUMENTED: ICD-10-PCS | Mod: ,,, | Performed by: NURSE PRACTITIONER

## 2023-08-24 PROCEDURE — 3079F DIAST BP 80-89 MM HG: CPT | Mod: ,,, | Performed by: NURSE PRACTITIONER

## 2023-08-24 PROCEDURE — 99214 OFFICE O/P EST MOD 30 MIN: CPT | Mod: ,,, | Performed by: NURSE PRACTITIONER

## 2023-08-24 PROCEDURE — 1159F MED LIST DOCD IN RCRD: CPT | Mod: ,,, | Performed by: NURSE PRACTITIONER

## 2023-08-24 PROCEDURE — 99214 PR OFFICE/OUTPT VISIT, EST, LEVL IV, 30-39 MIN: ICD-10-PCS | Mod: ,,, | Performed by: NURSE PRACTITIONER

## 2023-08-24 PROCEDURE — 84550 URIC ACID: ICD-10-PCS | Mod: ,,, | Performed by: CLINICAL MEDICAL LABORATORY

## 2023-08-24 PROCEDURE — 1159F PR MEDICATION LIST DOCUMENTED IN MEDICAL RECORD: ICD-10-PCS | Mod: ,,, | Performed by: NURSE PRACTITIONER

## 2023-08-24 PROCEDURE — 85651 SEDIMENTATION RATE, AUTOMATED: ICD-10-PCS | Mod: ,,, | Performed by: CLINICAL MEDICAL LABORATORY

## 2023-08-24 PROCEDURE — 3008F BODY MASS INDEX DOCD: CPT | Mod: ,,, | Performed by: NURSE PRACTITIONER

## 2023-08-24 RX ORDER — POLYETHYLENE GLYCOL 3350, SODIUM SULFATE ANHYDROUS, SODIUM BICARBONATE, SODIUM CHLORIDE, POTASSIUM CHLORIDE 236; 22.74; 6.74; 5.86; 2.97 G/4L; G/4L; G/4L; G/4L; G/4L
4000 POWDER, FOR SOLUTION ORAL ONCE
Status: CANCELLED | OUTPATIENT
Start: 2023-08-24 | End: 2023-08-24

## 2023-08-24 RX ORDER — MELOXICAM 15 MG/1
15 TABLET ORAL DAILY
Qty: 30 TABLET | Refills: 2 | Status: SHIPPED | OUTPATIENT
Start: 2023-08-24 | End: 2023-11-21

## 2023-08-24 RX ORDER — AMLODIPINE BESYLATE 5 MG/1
5 TABLET ORAL DAILY
Qty: 90 TABLET | Refills: 3 | Status: SHIPPED | OUTPATIENT
Start: 2023-08-24 | End: 2024-04-01 | Stop reason: SDUPTHER

## 2023-08-24 RX ORDER — HYDROCHLOROTHIAZIDE 12.5 MG/1
12.5 TABLET ORAL DAILY
Qty: 90 TABLET | Refills: 3 | Status: SHIPPED | OUTPATIENT
Start: 2023-08-24 | End: 2024-04-01 | Stop reason: SDUPTHER

## 2023-08-24 NOTE — LETTER
August 24, 2023    Asia Schwartz  [address] [place] [address]             [facility]  Family Medicine  [address] [place] [address]  Phone: [phone]  Fax: [phone]   August 24, 2023     Patient: Asia Schwartz   YOB: 1960   Date of Visit: 8/24/2023       To Whom it May Concern:    Asia Schwartz was seen in my clinic on 8/24/2023. She may return to work on 8/24/23 .    Please excuse her from any classes or work missed.    If you have any questions or concerns, please don't hesitate to call.    Sincerely,         Klarissa Infante, CHRISTIANNEP

## 2023-08-24 NOTE — PROGRESS NOTES
SAMANTHA Khan   [facility]      PATIENT NAME: Asia Schwartz  : 1960  DATE: 23  MRN: [id number]      Patient PCP Information       Provider PCP Type    Klarissa JAMISON SAMANTHA Infante [facility]            Reason for Visit / Chief Complaint: Hypertension and Follow-up (Rm 1//Patient presents to clinic for 3 month follow up of HTN. Swelling in bilateral hands after working with tulips and hyinith)         History of Present Illness / Problem Focused Workflow     Asia Schwartz presents to the clinic with Hypertension and Follow-up (Rm 1//Patient presents to clinic for 3 month follow up of HTN. Swelling in bilateral hands after working with tulips and hyinith)     HPI  Ms. Schwartz presents to clinic for HTN follow up.   Patient works at [employer]. Complains of hand swelling and pain. Patient working with tulip bulbs daily.   Patient was concerned for sensitivity. States she is wearing latex free gloves.   Joint pains and swelling, right greater than left.   Patient otherwise taking antihypertensive as prescribed. BP well controlled.       Review of Systems     Review of Systems   Constitutional:  Negative for activity change, appetite change, chills, diaphoresis, fatigue, fever and unexpected weight change.   HENT:  Negative for congestion, ear pain, facial swelling, hearing loss, nosebleeds and sore throat.    Eyes: Negative.    Respiratory:  Negative for apnea, cough, shortness of breath and wheezing.    Cardiovascular:  Negative for chest pain, palpitations and leg swelling.   Gastrointestinal:  Negative for abdominal distention, abdominal pain, blood in stool, constipation, diarrhea and nausea.   Endocrine: Negative for cold intolerance, heat intolerance, polydipsia, polyphagia and polyuria.   Genitourinary:  Negative for decreased urine volume, difficulty urinating, dysuria, flank pain, frequency, hematuria and urgency.   Musculoskeletal:  Positive for arthralgias and joint swelling.  "Negative for myalgias.   Skin:  Negative for color change and rash.   Allergic/Immunologic: Negative.    Neurological:  Negative for dizziness, tremors, seizures, syncope, facial asymmetry, speech difficulty, weakness, light-headedness, numbness and headaches.   Hematological:  Negative for adenopathy. Does not bruise/bleed easily.   Psychiatric/Behavioral:  Negative for behavioral problems and confusion.        Medical / Social / Family History     Past Medical History:   Diagnosis Date    Essential (primary) hypertension     Smokes     Vision impairment     left       Past Surgical History:   Procedure Laterality Date    ECTOPIC PREGNANCY SURGERY         Social History  Ms.  reports that she has been smoking cigarettes. She has never used smokeless tobacco. She reports that she does not currently use alcohol. She reports that she does not use drugs.    Family History  Ms.'s family history includes Cancer in her mother and sister; Diabetes in her brother and mother; Hypertension in her brother and mother.    Medications and Allergies     Medications  Outpatient Medications Marked as Taking for the 8/24/23 encounter (Office Visit) with Klarissa Infante FNP   Medication Sig Dispense Refill    brimonidine-timoloL (COMBIGAN) 0.2-0.5 % Drop Place 1 drop into the left eye 2 (two) times a day.      [DISCONTINUED] amLODIPine (NORVASC) 5 MG tablet Take 1 tablet (5 mg total) by mouth once daily. 30 tablet 2    [DISCONTINUED] hydroCHLOROthiazide (HYDRODIURIL) 12.5 MG Tab Take 1 tablet (12.5 mg total) by mouth once daily. 30 tablet 2       Allergies  Review of patient's allergies indicates:  No Known Allergies    Physical Examination     Vitals:    08/24/23 0727   BP: 135/89   BP Location: Left arm   Patient Position: Sitting   BP Method: Large (Automatic)   Pulse: 95   Resp: 20   Temp: 98.8 °F (37.1 °C)   TempSrc: Oral   SpO2: 98%   Weight: 77.6 kg (171 lb)   Height: 5' 9" (1.753 m)       Physical Exam  Vitals and nursing " note reviewed.   Constitutional:       Appearance: Normal appearance. She is normal weight.   HENT:      Head: Normocephalic.      Right Ear: External ear normal.      Left Ear: External ear normal.      Nose: Nose normal.      Mouth/Throat:      Mouth: Mucous membranes are moist.   Eyes:      Extraocular Movements: Extraocular movements intact.      Conjunctiva/sclera: Conjunctivae normal.      Pupils: Pupils are equal, round, and reactive to light.   Cardiovascular:      Rate and Rhythm: Normal rate and regular rhythm.      Pulses: Normal pulses.      Heart sounds: Normal heart sounds. No murmur heard.  Pulmonary:      Effort: Pulmonary effort is normal.      Breath sounds: Normal breath sounds. No stridor. No wheezing or rhonchi.   Abdominal:      General: Bowel sounds are normal. There is no distension.      Palpations: Abdomen is soft. There is no mass.      Tenderness: There is no abdominal tenderness.   Musculoskeletal:         General: Swelling present. Normal range of motion.      Right hand: Swelling and tenderness present.      Left hand: Swelling present.      Cervical back: Normal range of motion and neck supple.      Right lower leg: No edema.      Left lower leg: No edema.      Comments: Swelling and finger proximal to distal IP joints.    Skin:     General: Skin is warm and dry.      Capillary Refill: Capillary refill takes less than 2 seconds.   Neurological:      General: No focal deficit present.      Mental Status: She is alert and oriented to person, place, and time. Mental status is at baseline.      Cranial Nerves: No cranial nerve deficit.      Sensory: No sensory deficit.      Motor: No weakness.   Psychiatric:         Mood and Affect: Mood normal.         Behavior: Behavior normal.         Thought Content: Thought content normal.         Judgment: Judgment normal.           No visits with results within 14 Day(s) from this visit.   Latest known visit with results is:   Patient Outreach on  06/05/2023   Component Date Value Ref Range Status    BCS Recommendation External 10/24/2022 Repeat mammogram in 1 year   Final             Assessment and Plan (including Health Maintenance)       Plan:   Essential hypertension  Comments:  continue amlodipine and hctz  Orders:  -     amLODIPine (NORVASC) 5 MG tablet; Take 1 tablet (5 mg total) by mouth once daily.  Dispense: 90 tablet; Refill: 3  -     hydroCHLOROthiazide (HYDRODIURIL) 12.5 MG Tab; Take 1 tablet (12.5 mg total) by mouth once daily.  Dispense: 90 tablet; Refill: 3    Arthritis of hand  -     meloxicam (MOBIC) 15 MG tablet; Take 1 tablet (15 mg total) by mouth once daily.  Dispense: 30 tablet; Refill: 2  -     Rheumatoid Factor Screen; Future; Expected date: 08/24/2023  -     Sedimentation Rate; Future; Expected date: 08/24/2023  -     Uric Acid; Future; Expected date: 08/24/2023    Screen for colon cancer  -     Colonoscopy; Future; Expected date: 08/24/2023     RTC in 3 mo for follow up  Start mobic. Labs pending  There are no Patient Instructions on file for this visit.       Health Maintenance Due   Topic Date Due    Cervical Cancer Screening  Never done    Pneumococcal Vaccines (Age 0-64) (1 - PCV) Never done    TETANUS VACCINE  Never done    Hemoglobin A1c (Diabetic Prevention Screening)  Never done    Colorectal Cancer Screening  Never done    Shingles Vaccine (1 of 2) Never done    COVID-19 Vaccine (5 - Moderna series) 09/09/2022    Mammogram  10/24/2023       Most Recent Immunizations   Administered Date(s) Administered    COVID-19 Vaccine 07/15/2022    COVID-19, MRNA, LN-S, PF (MODERNA FULL 0.5 ML DOSE) 04/20/2021        Problem List Items Addressed This Visit          Cardiac/Vascular    Essential hypertension - Primary    Relevant Medications    amLODIPine (NORVASC) 5 MG tablet    hydroCHLOROthiazide (HYDRODIURIL) 12.5 MG Tab       Orthopedic    Arthritis of hand    Relevant Medications    meloxicam (MOBIC) 15 MG tablet    Other Relevant  Orders    Rheumatoid Factor Screen    Sedimentation Rate    Uric Acid     Other Visit Diagnoses       Screen for colon cancer        Relevant Orders    Colonoscopy            Health Maintenance Topics with due status: Not Due       Topic Last Completion Date    Lipid Panel 05/24/2023    Influenza Vaccine Not Due       Future Appointments   Date Time Provider Department Center   11/27/2023  7:30 AM Klarissa Infante FNP [facility]   2/26/2024  7:00 AM [facility] GI ROOM [facility]   5/28/2024  9:15 AM Klarissa Infante FNP [facility]            Signature:  SAMANTHA Khan  [facility]     Date of encounter: 8/24/23

## 2023-11-07 LAB — BCS RECOMMENDATION EXT: NORMAL

## 2023-11-21 DIAGNOSIS — M19.049 ARTHRITIS OF HAND: ICD-10-CM

## 2023-11-21 RX ORDER — MELOXICAM 15 MG/1
15 TABLET ORAL
Qty: 30 TABLET | Refills: 0 | Status: SHIPPED | OUTPATIENT
Start: 2023-11-21 | End: 2023-11-27 | Stop reason: SDUPTHER

## 2023-11-27 ENCOUNTER — PATIENT OUTREACH (OUTPATIENT)
Dept: ADMINISTRATIVE | Facility: HOSPITAL | Age: 63
End: 2023-11-27

## 2023-11-27 ENCOUNTER — OFFICE VISIT (OUTPATIENT)
Dept: FAMILY MEDICINE | Facility: CLINIC | Age: 63
End: 2023-11-27
Payer: COMMERCIAL

## 2023-11-27 VITALS
DIASTOLIC BLOOD PRESSURE: 77 MMHG | HEIGHT: 69 IN | OXYGEN SATURATION: 95 % | TEMPERATURE: 98 F | SYSTOLIC BLOOD PRESSURE: 112 MMHG | RESPIRATION RATE: 20 BRPM | HEART RATE: 103 BPM | BODY MASS INDEX: 25.62 KG/M2 | WEIGHT: 173 LBS

## 2023-11-27 DIAGNOSIS — I10 HYPERTENSION, UNSPECIFIED TYPE: Primary | Chronic | ICD-10-CM

## 2023-11-27 DIAGNOSIS — Z13.29 SCREENING FOR THYROID DISORDER: ICD-10-CM

## 2023-11-27 DIAGNOSIS — F17.200 SMOKER: ICD-10-CM

## 2023-11-27 DIAGNOSIS — M19.049 ARTHRITIS OF HAND: ICD-10-CM

## 2023-11-27 PROBLEM — H54.7 VISUAL LOSS: Status: ACTIVE | Noted: 2023-11-27

## 2023-11-27 LAB — TSH SERPL DL<=0.005 MIU/L-ACNC: 1.37 UIU/ML (ref 0.36–3.74)

## 2023-11-27 PROCEDURE — 1159F PR MEDICATION LIST DOCUMENTED IN MEDICAL RECORD: ICD-10-PCS | Mod: ,,, | Performed by: NURSE PRACTITIONER

## 2023-11-27 PROCEDURE — 3078F PR MOST RECENT DIASTOLIC BLOOD PRESSURE < 80 MM HG: ICD-10-PCS | Mod: ,,, | Performed by: NURSE PRACTITIONER

## 2023-11-27 PROCEDURE — 3078F DIAST BP <80 MM HG: CPT | Mod: ,,, | Performed by: NURSE PRACTITIONER

## 2023-11-27 PROCEDURE — 1160F PR REVIEW ALL MEDS BY PRESCRIBER/CLIN PHARMACIST DOCUMENTED: ICD-10-PCS | Mod: ,,, | Performed by: NURSE PRACTITIONER

## 2023-11-27 PROCEDURE — 99213 PR OFFICE/OUTPT VISIT, EST, LEVL III, 20-29 MIN: ICD-10-PCS | Mod: ,,, | Performed by: NURSE PRACTITIONER

## 2023-11-27 PROCEDURE — 1160F RVW MEDS BY RX/DR IN RCRD: CPT | Mod: ,,, | Performed by: NURSE PRACTITIONER

## 2023-11-27 PROCEDURE — 84443 ASSAY THYROID STIM HORMONE: CPT | Mod: ,,, | Performed by: CLINICAL MEDICAL LABORATORY

## 2023-11-27 PROCEDURE — 3008F PR BODY MASS INDEX (BMI) DOCUMENTED: ICD-10-PCS | Mod: ,,, | Performed by: NURSE PRACTITIONER

## 2023-11-27 PROCEDURE — 1159F MED LIST DOCD IN RCRD: CPT | Mod: ,,, | Performed by: NURSE PRACTITIONER

## 2023-11-27 PROCEDURE — 3008F BODY MASS INDEX DOCD: CPT | Mod: ,,, | Performed by: NURSE PRACTITIONER

## 2023-11-27 PROCEDURE — 3074F SYST BP LT 130 MM HG: CPT | Mod: ,,, | Performed by: NURSE PRACTITIONER

## 2023-11-27 PROCEDURE — 3074F PR MOST RECENT SYSTOLIC BLOOD PRESSURE < 130 MM HG: ICD-10-PCS | Mod: ,,, | Performed by: NURSE PRACTITIONER

## 2023-11-27 PROCEDURE — 84443 TSH: ICD-10-PCS | Mod: ,,, | Performed by: CLINICAL MEDICAL LABORATORY

## 2023-11-27 PROCEDURE — 99213 OFFICE O/P EST LOW 20 MIN: CPT | Mod: ,,, | Performed by: NURSE PRACTITIONER

## 2023-11-27 RX ORDER — MELOXICAM 15 MG/1
15 TABLET ORAL DAILY
Qty: 30 TABLET | Refills: 0 | Status: SHIPPED | OUTPATIENT
Start: 2023-11-27

## 2023-11-27 NOTE — PROGRESS NOTES
Klarissa YAQUELIN SAMANTHA Infante        PATIENT NAME: Asia Schwartz  : 1960  DATE: 23  MRN: [id number]      Patient PCP Information       Provider PCP Type    Klarissa InfanteSAMANTHA General            Reason for Visit / Chief Complaint: Follow-up (Judi presents to the clinic for a 3m f/u htn/Rm 1)         History of Present Illness / Problem Focused Workflow     Asia Schwartz presents to the clinic with Follow-up (Judi presents to the clinic for a 3m f/u htn/Rm 1)     HPI  Ms Schwartz is here for htn follow up. Patient bp well controlled on current antihypertensive regimen.   Patient denies chest pain or SOB.  Patient reports improved arthritis to hands on mobic. Refill needed.   Patient missed pap smear appt. Aware pap smear due reschedule recommended.   Patient had mamm[facility] at [facility] earlier this month. Message sent to obtain record.     Review of Systems     Review of Systems   Constitutional:  Negative for activity change, appetite change, chills, diaphoresis, fatigue, fever and unexpected weight change.   HENT:  Negative for congestion, ear pain, facial swelling, hearing loss, nosebleeds and sore throat.    Eyes: Negative.    Respiratory:  Negative for apnea, cough, shortness of breath and wheezing.    Cardiovascular:  Negative for chest pain, palpitations and leg swelling.   Gastrointestinal:  Negative for abdominal distention, abdominal pain, blood in stool, constipation, diarrhea and nausea.   Endocrine: Negative for cold intolerance, heat intolerance, polydipsia, polyphagia and polyuria.   Genitourinary:  Negative for decreased urine volume, difficulty urinating, dysuria, flank pain, frequency, hematuria and urgency.   Musculoskeletal:  Negative for arthralgias, joint swelling and myalgias.   Skin:  Negative for color change and rash.   Allergic/Immunologic: Negative.    Neurological:  Negative for dizziness, tremors, seizures, syncope, facial asymmetry, speech difficulty, weakness,  "light-headedness, numbness and headaches.   Hematological:  Negative for adenopathy. Does not bruise/bleed easily.   Psychiatric/Behavioral:  Negative for behavioral problems and confusion.        Medical / Social / Family History     Past Medical History:   Diagnosis Date    Essential (primary) hypertension     Smokes     Vision impairment     left       Past Surgical History:   Procedure Laterality Date    ECTOPIC PREGNANCY SURGERY         Social History  Ms.  reports that she has been smoking cigarettes. She has never used smokeless tobacco. She reports that she does not currently use alcohol. She reports that she does not use drugs.    Family History  Ms.'s family history includes Cancer in her mother and sister; Diabetes in her brother and mother; Hypertension in her brother and mother.    Medications and Allergies     Medications  Outpatient Medications Marked as Taking for the 11/27/23 encounter (Office Visit) with Klarissa Infante FNP   Medication Sig Dispense Refill    amLODIPine (NORVASC) 5 MG tablet Take 1 tablet (5 mg total) by mouth once daily. 90 tablet 3    brimonidine-timoloL (COMBIGAN) 0.2-0.5 % Drop Place 1 drop into the left eye 2 (two) times a day.      hydroCHLOROthiazide (HYDRODIURIL) 12.5 MG Tab Take 1 tablet (12.5 mg total) by mouth once daily. 90 tablet 3    [DISCONTINUED] meloxicam (MOBIC) 15 MG tablet Take 1 tablet by mouth once daily 30 tablet 0       Allergies  Review of patient's allergies indicates:  No Known Allergies    Physical Examination     Vitals:    11/27/23 0747   BP: 112/77   BP Location: Right arm   Patient Position: Sitting   BP Method: Medium (Automatic)   Pulse: 103   Resp: 20   Temp: 98.2 °F (36.8 °C)   TempSrc: Oral   SpO2: 95%   Weight: 78.5 kg (173 lb)   Height: 5' 9" (1.753 m)       Physical Exam  Vitals reviewed.   Constitutional:       Appearance: Normal appearance.   HENT:      Head: Normocephalic.      Right Ear: External ear normal.      Left Ear: External ear " normal.      Nose: Nose normal.      Mouth/Throat:      Mouth: Mucous membranes are moist.   Eyes:      Extraocular Movements: Extraocular movements intact.   Cardiovascular:      Rate and Rhythm: Normal rate and regular rhythm.      Pulses: Normal pulses.      Heart sounds: Normal heart sounds.   Pulmonary:      Effort: Pulmonary effort is normal.      Breath sounds: Normal breath sounds.   Abdominal:      General: Bowel sounds are normal.   Musculoskeletal:         General: Normal range of motion.      Cervical back: Normal range of motion.   Skin:     General: Skin is warm and dry.      Capillary Refill: Capillary refill takes less than 2 seconds.   Neurological:      General: No focal deficit present.      Mental Status: She is alert and oriented to person, place, and time.   Psychiatric:         Mood and Affect: Mood normal.         Behavior: Behavior normal.         Thought Content: Thought content normal.         Judgment: Judgment normal.           No visits with results within 14 Day(s) from this visit.   Latest known visit with results is:   Office Visit on 08/24/2023   Component Date Value Ref Range Status    RA 08/24/2023 Negative  Negative Final    ESR Westergren 08/24/2023 2  0 - 30 mm/Hr Final    Uric Acid 08/24/2023 3.9  2.6 - 6.0 mg/dL Final             Assessment and Plan (including Health Maintenance)       Plan:   Hypertension, unspecified type  Comments:  continue amlodipine and hctz    Arthritis of hand  -     meloxicam (MOBIC) 15 MG tablet; Take 1 tablet (15 mg total) by mouth once daily.  Dispense: 30 tablet; Refill: 0    Screening for thyroid disorder  Comments:  HR >100 bpm, thyroid screening ordered  Orders:  -     TSH; Future; Expected date: 11/27/2023    Smoker  -     Ambulatory referral/consult to Smoking Cessation Program; Future; Expected date: 12/04/2023         -   Advised to quit smoking  Impact of smoking on health discussed  Discussed method and skills for cessation     RTC in 3  mo for htn follow up  There are no Patient Instructions on file for this visit.       Health Maintenance Due   Topic Date Due    Cervical Cancer Screening  Never done    Pneumococcal Vaccines (Age 0-64) (1 - PCV) Never done    TETANUS VACCINE  Never done    Hemoglobin A1c (Diabetic Prevention Screening)  Never done    Colorectal Cancer Screening  Never done    Shingles Vaccine (1 of 2) Never done    RSV Vaccine (Age 60+ and Pregnant patients) (1 - 1-dose 60+ series) Never done    Influenza Vaccine (1) Never done    COVID-19 Vaccine (5 - 2023-24 season) 09/01/2023    Mammogram  10/24/2023       Most Recent Immunizations   Administered Date(s) Administered    COVID-19 Vaccine 07/15/2022    COVID-19, MRNA, LN-S, PF (MODERNA FULL 0.5 ML DOSE) 04/20/2021        Problem List Items Addressed This Visit          Orthopedic    Arthritis of hand    Relevant Medications    meloxicam (MOBIC) 15 MG tablet     Other Visit Diagnoses       Hypertension, unspecified type  (Chronic)  (Well controlled)  -  Primary    continue amlodipine and hctz    Screening for thyroid disorder        HR >100 bpm, thyroid screening ordered    Relevant Orders    TSH    Smoker        Relevant Orders    Ambulatory referral/consult to Smoking Cessation Program            Health Maintenance Topics with due status: Not Due       Topic Last Completion Date    Lipid Panel 05/24/2023       Future Appointments   Date Time Provider Department Center   2/26/2024  7:00 AM [facility] GI ROOM 01 [facility]   3/5/2024  7:15 AM [facility]   5/28/2024  9:15 AM Klarissa Infante FNP [facility]            Signature:  SAMANTHA Khan  [facility]     Date of encounter: 11/27/23

## 2023-11-27 NOTE — LETTER
AUTHORIZATION FOR RELEASE OF   CONFIDENTIAL INFORMATION    Dear [facility],    We are seeing Asia Schwartz, date of birth 1960, in the clinic at [facility]. Klarissa Infante FNP is the patient's PCP. Asia Schwartz has an outstanding lab/procedure at the time we reviewed her chart. In order to help keep her health information updated, she has authorized us to request the following medical record(s):        ( x )  MAMMOGRAM                                      (  )  COLONOSCOPY      (  )  PAP SMEAR                                          (  )  OUTSIDE LAB RESULTS     (  )  DEXA SCAN                                          (  )  EYE EXAM            (  )  FOOT EXAM                                          (  )  ENTIRE RECORD     (  )  OUTSIDE IMMUNIZATIONS                 (  )  _______________         Please fax records to Beulah Javier LPN Care Coordinator at [phone].      If you have any questions, please contact Beulah at [phone].           Patient Name: Asia Schwartz  : 1960  Patient Phone #: [phone]

## 2023-11-27 NOTE — PROGRESS NOTES
Population Health Chart Review & Patient Outreach Details    Updates Requested / Reviewed:  [x]  Care Team Updated    Health Maintenance Topics Addressed and Outreach Outcomes / Actions Taken:           Breast Cancer Screening [x] AGNELA sent to [facility].

## 2023-11-28 ENCOUNTER — PATIENT OUTREACH (OUTPATIENT)
Dept: ADMINISTRATIVE | Facility: HOSPITAL | Age: 63
End: 2023-11-28

## 2023-11-28 NOTE — PROGRESS NOTES
Population Health Chart Review & Patient Outreach Details    Health Maintenance Topics Addressed and Outreach Outcomes / Actions Taken:           Breast Cancer Screening [x] HM Updated with November 2023 Mammogram ([facility]). History Updated.

## 2024-02-15 ENCOUNTER — PATIENT OUTREACH (OUTPATIENT)
Dept: ADMINISTRATIVE | Facility: HOSPITAL | Age: 64
End: 2024-02-15

## 2024-02-15 NOTE — PROGRESS NOTES
Population Health Chart Review & Patient Outreach Details  Per [facility] website, insurance is active and pt is listed on the attributed list needing a healthy you performed in   Hy scheduled for 2024    Further Action Needed If Patient Returns Outreach:            Updates Requested / Reviewed:     []  Care Everywhere    []     []  External Sources ([facility], [facility], DIS, etc.)    [] [facility]   [] [facility]   [] Other:    []  Care Team Updated   []  Removed  or Duplicate Orders   []  Immunization Reconciliation Completed / Queried    [] [place]   [] [place]   [] [place]   [] [place]      Health Maintenance Topics Addressed and Outreach Outcomes / Actions Taken:             Breast Cancer Screening []  Mammogram Order Placed    []  Mammogram Screening Scheduled    []  External Records Requested & Care Team Updated if Applicable    []  External Records Uploaded & Care Team Updated if Applicable    []  Pt Declined Scheduling Mammogram    []  Pt Will Schedule with External Provider / Order Routed & Care Team Updated if Applicable              Cervical Cancer Screening []  Pap Smear Scheduled in Primary Care or OBGYN    []  External Records Requested & Care Team Updated if Applicable       []  External Records Uploaded, Care Team Updated, & History Updated if Applicable    []  Patient Declined Scheduling Pap Smear    []  Patient Will Schedule with External Provider & Care Team Updated if Applicable                  Colorectal Cancer Screening []  Colonoscopy Case Request / Referral / Home Test Order Placed    []  External Records Requested & Care Team Updated if Applicable    []  External Records Uploaded, Care Team Updated, & History Updated if Applicable    []  Patient Declined Completing Colon Cancer Screening    []  Patient Will Schedule with External Provider & Care Team Updated if Applicable    []  Fit Kit Mailed (add the SmartPhrase under additional notes)    []  Reminded Patient to Complete  Home Test                Diabetic Eye Exam []  Eye Exam Screening Order Placed    []  Eye Camera Scheduled or Optometry/Ophthalmology Referral Placed    []  External Records Requested & Care Team Updated if Applicable    []  External Records Uploaded, Care Team Updated, & History Updated if Applicable    []  Patient Declined Scheduling Eye Exam    []  Patient Will Schedule with External Provider & Care Team Updated if Applicable             Blood Pressure Control []  Primary Care Follow Up Visit Scheduled     []  Remote Blood Pressure Reading Captured    []  Patient Declined Remote Reading or Scheduling Appt - Escalated to PCP    []  Patient Will Call Back or Send Portal Message with Reading                 HbA1c & Other Labs []  Overdue Lab(s) Ordered    []  Overdue Lab(s) Scheduled    []  External Records Uploaded & Care Team Updated if Applicable    []  Primary Care Follow Up Visit Scheduled     []  Reminded Patient to Complete A1c Home Test    []  Patient Declined Scheduling Labs or Will Call Back to Schedule    []  Patient Will Schedule with External Provider / Order Routed, & Care Team Updated if Applicable           Primary Care Appointment []  Primary Care Appt Scheduled    []  Patient Declined Scheduling or Will Call Back to Schedule    []  Pt Established with External Provider, Updated Care Team, & Informed Pt to Notify Payor if Applicable           Medication Adherence /    Statin Use []  Primary Care Appointment Scheduled    []  Patient Reminded to  Prescription    []  Patient Declined, Provider Notified if Needed    []  Sent Provider Message to Review to Evaluate Pt for Statin, Add Exclusion Dx Codes, Document   Exclusion in Problem List, Change Statin Intensity Level to Moderate or High Intensity if Applicable                Osteoporosis Screening []  Dexa Order Placed    []  Dexa Appointment Scheduled    []  External Records Requested & Care Team Updated    []  External Records Uploaded, Care  Team Updated, & History Updated if Applicable    []  Patient Declined Scheduling Dexa or Will Call Back to Schedule    []  Patient Will Schedule with External Provider / Order Routed & Care Team Updated if Applicable       Additional Notes:

## 2024-04-01 DIAGNOSIS — I10 ESSENTIAL HYPERTENSION: ICD-10-CM

## 2024-04-01 RX ORDER — HYDROCHLOROTHIAZIDE 12.5 MG/1
12.5 TABLET ORAL DAILY
Qty: 90 TABLET | Refills: 3 | Status: SHIPPED | OUTPATIENT
Start: 2024-04-01 | End: 2025-04-01

## 2024-04-01 RX ORDER — AMLODIPINE BESYLATE 5 MG/1
5 TABLET ORAL DAILY
Qty: 90 TABLET | Refills: 3 | Status: SHIPPED | OUTPATIENT
Start: 2024-04-01 | End: 2025-04-01

## 2024-04-01 NOTE — TELEPHONE ENCOUNTER
Patient needing BP medications changed to [facility]. Thanks.

## 2024-04-01 NOTE — TELEPHONE ENCOUNTER
----- Message from Sonam Overton sent at 4/1/2024  7:38 AM CDT -----  Pt called and needs a refll on her BP meds [facility] to [facility] [address] [facility] call back number is [phone]

## 2024-05-29 ENCOUNTER — PATIENT OUTREACH (OUTPATIENT)
Facility: HOSPITAL | Age: 64
End: 2024-05-29
Payer: COMMERCIAL

## 2024-05-29 NOTE — PROGRESS NOTES
Population Health Chart Review & Patient Outreach Details      Further Action Needed If Patient Returns Outreach:            Updates Requested / Reviewed:     []  Care Everywhere    []     []  External Sources ([facility], [facility], [facility], etc.)    [] [facility]   [] [facility]   [] Other:    []  Care Team Updated   []  Removed  or Duplicate Orders   []  Immunization Reconciliation Completed / Queried    [] [place]   [] [place]   [] [place]   [] [place]      Health Maintenance Topics Addressed and Outreach Outcomes / Actions Taken:             Breast Cancer Screening []  Mammogram Order Placed    []  Mammogram Screening Scheduled    []  External Records Requested & Care Team Updated if Applicable    []  External Records Uploaded & Care Team Updated if Applicable    []  Pt Declined Scheduling Mammogram    []  Pt Will Schedule with External Provider / Order Routed & Care Team Updated if Applicable              Cervical Cancer Screening []  Pap Smear Scheduled in Primary Care or OBGYN    []  External Records Requested & Care Team Updated if Applicable       []  External Records Uploaded, Care Team Updated, & History Updated if Applicable    []  Patient Declined Scheduling Pap Smear    []  Patient Will Schedule with External Provider & Care Team Updated if Applicable                  Colorectal Cancer Screening []  Colonoscopy Case Request / Referral / Home Test Order Placed    []  External Records Requested & Care Team Updated if Applicable    []  External Records Uploaded, Care Team Updated, & History Updated if Applicable    []  Patient Declined Completing Colon Cancer Screening    []  Patient Will Schedule with External Provider & Care Team Updated if Applicable    []  Fit Kit Mailed (add the SmartPhrase under additional notes)    []  Reminded Patient to Complete Home Test                Diabetic Eye Exam []  Eye Exam Screening Order Placed    []  Eye Camera Scheduled or Optometry/Ophthalmology Referral  Placed    []  External Records Requested & Care Team Updated if Applicable    []  External Records Uploaded, Care Team Updated, & History Updated if Applicable    []  Patient Declined Scheduling Eye Exam    []  Patient Will Schedule with External Provider & Care Team Updated if Applicable             Blood Pressure Control []  Primary Care Follow Up Visit Scheduled     []  Remote Blood Pressure Reading Captured    []  Patient Declined Remote Reading or Scheduling Appt - Escalated to PCP    []  Patient Will Call Back or Send Portal Message with Reading                 HbA1c & Other Labs []  Overdue Lab(s) Ordered    []  Overdue Lab(s) Scheduled    []  External Records Uploaded & Care Team Updated if Applicable    []  Primary Care Follow Up Visit Scheduled     []  Reminded Patient to Complete A1c Home Test    []  Patient Declined Scheduling Labs or Will Call Back to Schedule    []  Patient Will Schedule with External Provider / Order Routed, & Care Team Updated if Applicable           Primary Care Appointment []  Primary Care Appt Scheduled    []  Patient Declined Scheduling or Will Call Back to Schedule    []  Pt Established with External Provider, Updated Care Team, & Informed Pt to Notify Payor if Applicable           Medication Adherence /    Statin Use []  Primary Care Appointment Scheduled    []  Patient Reminded to  Prescription    []  Patient Declined, Provider Notified if Needed    []  Sent Provider Message to Review to Evaluate Pt for Statin, Add Exclusion Dx Codes, Document   Exclusion in Problem List, Change Statin Intensity Level to Moderate or High Intensity if Applicable                Osteoporosis Screening []  Dexa Order Placed    []  Dexa Appointment Scheduled    []  External Records Requested & Care Team Updated    []  External Records Uploaded, Care Team Updated, & History Updated if Applicable    []  Patient Declined Scheduling Dexa or Will Call Back to Schedule    []  Patient Will Schedule  with External Provider / Order Routed & Care Team Updated if Applicable       Additional Notes:.  Pt needs appt for missed hy 5/28/24 appt sent to [facility] to schedule via staff message.

## 2025-04-03 ENCOUNTER — OFFICE VISIT (OUTPATIENT)
Dept: FAMILY MEDICINE | Facility: CLINIC | Age: 65
End: 2025-04-03
Payer: COMMERCIAL

## 2025-04-03 VITALS
HEART RATE: 79 BPM | WEIGHT: 152 LBS | BODY MASS INDEX: 22.51 KG/M2 | SYSTOLIC BLOOD PRESSURE: 105 MMHG | OXYGEN SATURATION: 95 % | TEMPERATURE: 99 F | RESPIRATION RATE: 20 BRPM | HEIGHT: 69 IN | DIASTOLIC BLOOD PRESSURE: 75 MMHG

## 2025-04-03 DIAGNOSIS — Z12.11 SCREENING FOR COLON CANCER: ICD-10-CM

## 2025-04-03 DIAGNOSIS — E55.9 VITAMIN D DEFICIENCY: ICD-10-CM

## 2025-04-03 DIAGNOSIS — Z13.1 SCREENING FOR DIABETES MELLITUS: ICD-10-CM

## 2025-04-03 DIAGNOSIS — E53.8 VITAMIN B 12 DEFICIENCY: ICD-10-CM

## 2025-04-03 DIAGNOSIS — R35.0 URINE FREQUENCY: Primary | ICD-10-CM

## 2025-04-03 DIAGNOSIS — I10 ESSENTIAL HYPERTENSION: ICD-10-CM

## 2025-04-03 DIAGNOSIS — Z13.29 SCREENING FOR ENDOCRINE DISORDER: ICD-10-CM

## 2025-04-03 DIAGNOSIS — Z13.6 ENCOUNTER FOR SCREENING FOR CARDIOVASCULAR DISORDERS: ICD-10-CM

## 2025-04-03 DIAGNOSIS — Z12.4 ENCOUNTER FOR SCREENING FOR CERVICAL CANCER: ICD-10-CM

## 2025-04-03 LAB
25(OH)D3 SERPL-MCNC: 34.8 NG/ML (ref 30–80)
ALBUMIN SERPL BCP-MCNC: 3.6 G/DL (ref 3.4–4.8)
ALBUMIN/GLOB SERPL: 1 {RATIO}
ALP SERPL-CCNC: 70 U/L (ref 40–150)
ALT SERPL W P-5'-P-CCNC: 11 U/L
ANION GAP SERPL CALCULATED.3IONS-SCNC: 12 MMOL/L (ref 7–16)
AST SERPL W P-5'-P-CCNC: 18 U/L (ref 11–45)
BASOPHILS # BLD AUTO: 0.03 K/UL (ref 0–0.2)
BASOPHILS NFR BLD AUTO: 0.4 % (ref 0–1)
BILIRUB SERPL-MCNC: 0.3 MG/DL
BILIRUB SERPL-MCNC: NEGATIVE MG/DL
BLOOD URINE, POC: NEGATIVE
BUN SERPL-MCNC: 20 MG/DL (ref 10–20)
BUN/CREAT SERPL: 23 (ref 6–20)
CALCIUM SERPL-MCNC: 9 MG/DL (ref 8.4–10.2)
CHLORIDE SERPL-SCNC: 109 MMOL/L (ref 98–107)
CHOLEST SERPL-MCNC: 134 MG/DL
CHOLEST/HDLC SERPL: 3.4 {RATIO}
CLARITY, UA: CLEAR
CO2 SERPL-SCNC: 24 MMOL/L (ref 23–31)
COLOR, UA: YELLOW
CREAT SERPL-MCNC: 0.87 MG/DL (ref 0.55–1.02)
CREAT UR-MCNC: 269 MG/DL (ref 15–325)
DIFFERENTIAL METHOD BLD: ABNORMAL
EGFR (NO RACE VARIABLE) (RUSH/TITUS): 75 ML/MIN/1.73M2
EOSINOPHIL # BLD AUTO: 0.14 K/UL (ref 0–0.5)
EOSINOPHIL NFR BLD AUTO: 2.1 % (ref 1–4)
ERYTHROCYTE [DISTWIDTH] IN BLOOD BY AUTOMATED COUNT: 14.9 % (ref 11.5–14.5)
EST. AVERAGE GLUCOSE BLD GHB EST-MCNC: 120 MG/DL
FOLATE SERPL-MCNC: 19.9 NG/ML (ref 7–31.4)
GLOBULIN SER-MCNC: 3.5 G/DL (ref 2–4)
GLUCOSE SERPL-MCNC: 100 MG/DL (ref 82–115)
GLUCOSE UR QL STRIP: NEGATIVE
HBA1C MFR BLD HPLC: 5.8 %
HCT VFR BLD AUTO: 39.8 % (ref 38–47)
HDLC SERPL-MCNC: 40 MG/DL (ref 35–60)
HGB BLD-MCNC: 12.3 G/DL (ref 12–16)
IMM GRANULOCYTES # BLD AUTO: 0.02 K/UL (ref 0–0.04)
IMM GRANULOCYTES NFR BLD: 0.3 % (ref 0–0.4)
KETONES UR QL STRIP: NEGATIVE
LDLC SERPL CALC-MCNC: 83 MG/DL
LDLC/HDLC SERPL: 2.1 {RATIO}
LEUKOCYTE ESTERASE URINE, POC: NEGATIVE
LYMPHOCYTES # BLD AUTO: 2.38 K/UL (ref 1–4.8)
LYMPHOCYTES NFR BLD AUTO: 35.4 % (ref 27–41)
MCH RBC QN AUTO: 26.9 PG (ref 27–31)
MCHC RBC AUTO-ENTMCNC: 30.9 G/DL (ref 32–36)
MCV RBC AUTO: 86.9 FL (ref 80–96)
MICROALBUMIN UR-MCNC: 1.3 MG/DL
MICROALBUMIN/CREAT RATIO PNL UR: 4.8 MG/G (ref 0–30)
MONOCYTES # BLD AUTO: 0.56 K/UL (ref 0–0.8)
MONOCYTES NFR BLD AUTO: 8.3 % (ref 2–6)
MPC BLD CALC-MCNC: 11.5 FL (ref 9.4–12.4)
NEUTROPHILS # BLD AUTO: 3.59 K/UL (ref 1.8–7.7)
NEUTROPHILS NFR BLD AUTO: 53.5 % (ref 53–65)
NITRITE, POC UA: NEGATIVE
NONHDLC SERPL-MCNC: 94 MG/DL
NRBC # BLD AUTO: 0 X10E3/UL
NRBC, AUTO (.00): 0 %
PH, POC UA: 6
PLATELET # BLD AUTO: 349 K/UL (ref 150–400)
POTASSIUM SERPL-SCNC: 3.8 MMOL/L (ref 3.5–5.1)
PROT SERPL-MCNC: 7.1 G/DL (ref 5.8–7.6)
PROTEIN, POC: NEGATIVE
RBC # BLD AUTO: 4.58 M/UL (ref 4.2–5.4)
SODIUM SERPL-SCNC: 141 MMOL/L (ref 136–145)
SPECIFIC GRAVITY, POC UA: 1.03
T4 FREE SERPL-MCNC: 0.9 NG/DL (ref 0.7–1.48)
TRIGL SERPL-MCNC: 56 MG/DL (ref 37–140)
TSH SERPL DL<=0.005 MIU/L-ACNC: 1.6 UIU/ML (ref 0.35–4.94)
UROBILINOGEN, POC UA: 0.2
VIT B12 SERPL-MCNC: 1165 PG/ML (ref 213–816)
VLDLC SERPL-MCNC: 11 MG/DL
WBC # BLD AUTO: 6.72 K/UL (ref 4.5–11)

## 2025-04-03 PROCEDURE — 99214 OFFICE O/P EST MOD 30 MIN: CPT | Mod: ,,,

## 2025-04-03 PROCEDURE — 3008F BODY MASS INDEX DOCD: CPT | Mod: CPTII,,,

## 2025-04-03 PROCEDURE — 80050 GENERAL HEALTH PANEL: CPT | Mod: ,,, | Performed by: CLINICAL MEDICAL LABORATORY

## 2025-04-03 PROCEDURE — 3061F NEG MICROALBUMINURIA REV: CPT | Mod: CPTII,,,

## 2025-04-03 PROCEDURE — 3078F DIAST BP <80 MM HG: CPT | Mod: CPTII,,,

## 2025-04-03 PROCEDURE — 3074F SYST BP LT 130 MM HG: CPT | Mod: CPTII,,,

## 2025-04-03 PROCEDURE — 82746 ASSAY OF FOLIC ACID SERUM: CPT | Mod: ,,, | Performed by: CLINICAL MEDICAL LABORATORY

## 2025-04-03 PROCEDURE — 82306 VITAMIN D 25 HYDROXY: CPT | Mod: ,,, | Performed by: CLINICAL MEDICAL LABORATORY

## 2025-04-03 PROCEDURE — 83036 HEMOGLOBIN GLYCOSYLATED A1C: CPT | Mod: ,,, | Performed by: CLINICAL MEDICAL LABORATORY

## 2025-04-03 PROCEDURE — 3044F HG A1C LEVEL LT 7.0%: CPT | Mod: CPTII,,,

## 2025-04-03 PROCEDURE — 1160F RVW MEDS BY RX/DR IN RCRD: CPT | Mod: CPTII,,,

## 2025-04-03 PROCEDURE — 1159F MED LIST DOCD IN RCRD: CPT | Mod: CPTII,,,

## 2025-04-03 PROCEDURE — 3066F NEPHROPATHY DOC TX: CPT | Mod: CPTII,,,

## 2025-04-03 PROCEDURE — 82043 UR ALBUMIN QUANTITATIVE: CPT | Mod: ,,, | Performed by: CLINICAL MEDICAL LABORATORY

## 2025-04-03 PROCEDURE — 82570 ASSAY OF URINE CREATININE: CPT | Mod: ,,, | Performed by: CLINICAL MEDICAL LABORATORY

## 2025-04-03 PROCEDURE — 84439 ASSAY OF FREE THYROXINE: CPT | Mod: ,,, | Performed by: CLINICAL MEDICAL LABORATORY

## 2025-04-03 PROCEDURE — 80061 LIPID PANEL: CPT | Mod: ,,, | Performed by: CLINICAL MEDICAL LABORATORY

## 2025-04-03 PROCEDURE — 82607 VITAMIN B-12: CPT | Mod: ,,, | Performed by: CLINICAL MEDICAL LABORATORY

## 2025-04-03 RX ORDER — HYDROCHLOROTHIAZIDE 12.5 MG/1
12.5 TABLET ORAL DAILY
Qty: 90 TABLET | Refills: 3 | Status: SHIPPED | OUTPATIENT
Start: 2025-04-03 | End: 2026-04-03

## 2025-04-03 RX ORDER — AMLODIPINE BESYLATE 5 MG/1
5 TABLET ORAL DAILY
Qty: 90 TABLET | Refills: 3 | Status: SHIPPED | OUTPATIENT
Start: 2025-04-03 | End: 2026-04-03

## 2025-04-03 RX ORDER — LATANOPROST 50 UG/ML
SOLUTION/ DROPS OPHTHALMIC
COMMUNITY
Start: 2024-10-24

## 2025-04-03 RX ORDER — PREDNISOLONE ACETATE 10 MG/ML
SUSPENSION/ DROPS OPHTHALMIC
COMMUNITY
Start: 2025-03-21

## 2025-04-03 NOTE — PROGRESS NOTES
Mariza Fry NP   [address] [place] [address]     PATIENT NAME: Asia Schwartz  : 1960  DATE: 4/3/25  MRN: [id number]      Billing Provider: Mariza Fry NP  Level of Service:   Patient PCP Information       None on File            Reason for Visit / Chief Complaint: Tingling (Pt presents to the clinic for hand tingling and frequent urination) and Health Maintenance (Decline all vaccinations today and would like to schedule cervical cancer and colorectal cancer screening )       Update PCP  Update Chief Complaint         History of Present Illness / Problem Focused Workflow     Asia Schwartz presents to the clinic with Tingling (Pt presents to the clinic for hand tingling and frequent urination) and Health Maintenance (Decline all vaccinations today and would like to schedule cervical cancer and colorectal cancer screening )     Patient here today with complaints of numbness and tingling in her fingers and toes. Symptoms have been present for the past few months. Hx of HTN and tobacco use. Denies other health diagnosis. BP well controlled with Amlodipine and HCTZ. Advised on DASH diet and daily BP checks. Advised on smoking cessation. She does report frequent urination without urine odor, hematuria, or dysuria. She is followed by [facility]. Labs today. Declines all vaccines. Pap smear and colonoscopy ordered. Return to clinic in 3 months and as needed.       Review of Systems     Review of Systems   Constitutional: Negative.    HENT: Negative.     Eyes:  Positive for visual disturbance.   Respiratory: Negative.  Negative for cough, shortness of breath and wheezing.    Cardiovascular: Negative.  Negative for chest pain, palpitations and leg swelling.   Gastrointestinal: Negative.  Negative for abdominal pain, nausea and vomiting.   Endocrine: Negative.    Genitourinary:  Positive for frequency. Negative for dysuria and hematuria.  "  Musculoskeletal: Negative.    Integumentary:  Negative.   Allergic/Immunologic: Negative.    Neurological: Negative.  Negative for dizziness and headaches.   Psychiatric/Behavioral: Negative.        Medical / Social / Family History     Past Medical History:   Diagnosis Date    [facility]    Essential (primary) hypertension     Smokes     Vision impairment     left       Past Surgical History:   Procedure Laterality Date    ECTOPIC PREGNANCY SURGERY         Social History  Ms.  reports that she has been smoking cigarettes. She has never used smokeless tobacco. She reports that she does not currently use alcohol. She reports that she does not use drugs.    Family History  Ms.'s family history includes Cancer in her mother and sister; Diabetes in her brother and mother; Hypertension in her brother and mother.    Medications and Allergies     Medications  Outpatient Medications Marked as Taking for the 4/3/25 encounter (Office Visit) with Mariza Fry NP   Medication Sig Dispense Refill    brimonidine-timoloL (COMBIGAN) 0.2-0.5 % Drop Place 1 drop into the left eye 2 (two) times a day.      latanoprost 0.005 % ophthalmic solution SMARTSI Drop(s) Left Eye Every Evening      meloxicam (MOBIC) 15 MG tablet Take 1 tablet (15 mg total) by mouth once daily. 30 tablet 0    prednisoLONE acetate (PRED FORTE) 1 % DrpS Place into both eyes.         Allergies  Review of patient's allergies indicates:  No Known Allergies    Physical Examination   /75 (BP Location: Right arm, Patient Position: Sitting)   Pulse 79   Temp 98.6 °F (37 °C) (Oral)   Resp 20   Ht 5' 9" (1.753 m)   Wt 68.9 kg (152 lb)   SpO2 95%   BMI 22.45 kg/m²    Physical Exam  Vitals reviewed.   Constitutional:       Appearance: Normal appearance.   HENT:      Right Ear: Tympanic membrane, ear canal and external ear normal.      Left Ear: Tympanic membrane, ear canal and external ear normal. "      Nose: Nose normal. No congestion or rhinorrhea.      Mouth/Throat:      Mouth: Mucous membranes are moist.      Pharynx: Oropharynx is clear. No posterior oropharyngeal erythema.   Eyes:      Extraocular Movements: Extraocular movements intact.      Conjunctiva/sclera: Conjunctivae normal.      Pupils: Pupils are equal, round, and reactive to light.   Cardiovascular:      Rate and Rhythm: Normal rate and regular rhythm.      Pulses: Normal pulses.      Heart sounds: Normal heart sounds.   Pulmonary:      Effort: Pulmonary effort is normal. No respiratory distress.      Breath sounds: Normal breath sounds. No wheezing or rales.   Abdominal:      General: Bowel sounds are normal.      Palpations: Abdomen is soft.      Tenderness: There is no right CVA tenderness, left CVA tenderness or guarding.   Musculoskeletal:         General: Normal range of motion.      Cervical back: Normal range of motion and neck supple.   Lymphadenopathy:      Cervical: No cervical adenopathy.   Skin:     General: Skin is warm and dry.      Capillary Refill: Capillary refill takes less than 2 seconds.   Neurological:      General: No focal deficit present.      Mental Status: She is alert and oriented to person, place, and time.   Psychiatric:         Mood and Affect: Mood normal.         Behavior: Behavior normal.         Thought Content: Thought content normal.         Judgment: Judgment normal.        Assessment and Plan (including Health Maintenance)      Problem List  Smart Sets  Document Outside HM   :    Plan:   Advised on DASH diet and daily BP checks  Advised on smoking cessation  Labs today  Declines all vaccines  Pap smear and colonoscopy ordered  Return to clinic in 3 months and as needed.             Health Maintenance Due   Topic Date Due    Cervical Cancer Screening  Never done    TETANUS VACCINE  Never done    Pneumococcal Vaccines (Age 50+) (1 of 2 - PCV) Never done    Colorectal Cancer Screening  Never done     Shingles Vaccine (1 of 2) Never done    RSV Vaccine (Age 60+ and Pregnant patients) (1 - Risk 60-74 years 1-dose series) Never done    Influenza Vaccine (1) Never done    COVID-19 Vaccine (5 - 2024-25 season) 09/01/2024       Problem List Items Addressed This Visit       Essential hypertension     Other Visit Diagnoses         Urine frequency    -  Primary            Health Maintenance Topics with due status: Not Due       Topic Last Completion Date    Lipid Panel 05/24/2023    Mammogram 12/12/2024       No future appointments.         Signature:  Mariza Fry NP      [address] [place] [address]    Date of encounter: 4/3/25

## 2025-04-03 NOTE — PATIENT INSTRUCTIONS
Advised on DASH diet and daily BP checks  Advised on smoking cessation  Labs today  Declines all vaccines  Pap smear and colonoscopy ordered  Return to clinic in 3 months and as needed.

## 2025-04-03 NOTE — LETTER
April 3, 2025    Asia Schwartz  [address] [place] [address]             [facility]  Family Medicine  [address] [place] [address]  Phone: [phone]  Fax: [phone]   April 3, 2025     Patient: Asia Schwartz   YOB: 1960   Date of Visit: 4/3/2025       To Whom it May Concern:    Asia Schwartz was seen in my clinic on 4/3/2025. She may return to work on 04/03/2025 .    Please excuse her from any classes or work missed.    If you have any questions or concerns, please don't hesitate to call.    Sincerely,         Mariza Fry, NP

## 2025-05-30 ENCOUNTER — OFFICE VISIT (OUTPATIENT)
Facility: CLINIC | Age: 65
End: 2025-05-30
Payer: COMMERCIAL

## 2025-05-30 VITALS
BODY MASS INDEX: 23.4 KG/M2 | OXYGEN SATURATION: 99 % | WEIGHT: 158 LBS | SYSTOLIC BLOOD PRESSURE: 111 MMHG | HEIGHT: 69 IN | DIASTOLIC BLOOD PRESSURE: 78 MMHG | HEART RATE: 93 BPM | RESPIRATION RATE: 17 BRPM

## 2025-05-30 DIAGNOSIS — K64.4 HEMORRHOIDS, EXTERNAL: ICD-10-CM

## 2025-05-30 DIAGNOSIS — Z12.4 ENCOUNTER FOR SCREENING FOR CERVICAL CANCER: Primary | ICD-10-CM

## 2025-05-30 DIAGNOSIS — Z11.51 SCREENING FOR HPV (HUMAN PAPILLOMAVIRUS): ICD-10-CM

## 2025-05-30 PROCEDURE — 99203 OFFICE O/P NEW LOW 30 MIN: CPT | Mod: ,,, | Performed by: ADVANCED PRACTICE MIDWIFE

## 2025-05-30 PROCEDURE — 3066F NEPHROPATHY DOC TX: CPT | Mod: ,,, | Performed by: ADVANCED PRACTICE MIDWIFE

## 2025-05-30 PROCEDURE — 3078F DIAST BP <80 MM HG: CPT | Mod: ,,, | Performed by: ADVANCED PRACTICE MIDWIFE

## 2025-05-30 PROCEDURE — 3008F BODY MASS INDEX DOCD: CPT | Mod: ,,, | Performed by: ADVANCED PRACTICE MIDWIFE

## 2025-05-30 PROCEDURE — 88142 CYTOPATH C/V THIN LAYER: CPT | Mod: TC,GCY | Performed by: ADVANCED PRACTICE MIDWIFE

## 2025-05-30 PROCEDURE — 3074F SYST BP LT 130 MM HG: CPT | Mod: ,,, | Performed by: ADVANCED PRACTICE MIDWIFE

## 2025-05-30 PROCEDURE — 3061F NEG MICROALBUMINURIA REV: CPT | Mod: ,,, | Performed by: ADVANCED PRACTICE MIDWIFE

## 2025-05-30 PROCEDURE — 87626 HPV SEP HI-RSK TYP&POOL RSLT: CPT | Mod: ,,, | Performed by: CLINICAL MEDICAL LABORATORY

## 2025-05-30 PROCEDURE — 1159F MED LIST DOCD IN RCRD: CPT | Mod: ,,, | Performed by: ADVANCED PRACTICE MIDWIFE

## 2025-05-30 PROCEDURE — 3044F HG A1C LEVEL LT 7.0%: CPT | Mod: ,,, | Performed by: ADVANCED PRACTICE MIDWIFE

## 2025-05-30 RX ORDER — HYDROCORTISONE 25 MG/G
CREAM TOPICAL 2 TIMES DAILY
Qty: 28 G | Refills: 2 | Status: SHIPPED | OUTPATIENT
Start: 2025-05-30

## 2025-06-01 PROBLEM — K64.4 HEMORRHOIDS, EXTERNAL: Status: ACTIVE | Noted: 2025-06-01

## 2025-06-04 LAB
GH SERPL-MCNC: NORMAL NG/ML
INSULIN SERPL-ACNC: NORMAL U[IU]/ML
LAB AP CLINICAL INFORMATION: NORMAL
LAB AP GYN INTERPRETATION: NEGATIVE
LAB AP PAP DISCLAIMER COMMENTS: NORMAL
RENIN PLAS-CCNC: NORMAL NG/ML/H

## 2025-06-05 ENCOUNTER — RESULTS FOLLOW-UP (OUTPATIENT)
Facility: CLINIC | Age: 65
End: 2025-06-05

## 2025-06-05 ENCOUNTER — TELEPHONE (OUTPATIENT)
Facility: CLINIC | Age: 65
End: 2025-06-05
Payer: COMMERCIAL

## 2025-06-05 LAB
HPV 16: NEGATIVE
HPV 18: NEGATIVE
HPV OTHER: NEGATIVE

## 2025-06-05 NOTE — PROGRESS NOTES
Advise Pap and HPV normal/negative. Recommend annual GYN exam and follow up screening Pap in 5 years.

## 2025-06-26 ENCOUNTER — OFFICE VISIT (OUTPATIENT)
Dept: FAMILY MEDICINE | Facility: CLINIC | Age: 65
End: 2025-06-26
Payer: COMMERCIAL

## 2025-06-26 VITALS
OXYGEN SATURATION: 98 % | TEMPERATURE: 99 F | BODY MASS INDEX: 23.82 KG/M2 | WEIGHT: 160.81 LBS | DIASTOLIC BLOOD PRESSURE: 67 MMHG | RESPIRATION RATE: 20 BRPM | SYSTOLIC BLOOD PRESSURE: 100 MMHG | HEART RATE: 73 BPM | HEIGHT: 69 IN

## 2025-06-26 DIAGNOSIS — H54.62 VISION LOSS OF LEFT EYE: ICD-10-CM

## 2025-06-26 DIAGNOSIS — Z00.00 ROUTINE GENERAL MEDICAL EXAMINATION AT A HEALTH CARE FACILITY: Primary | ICD-10-CM

## 2025-06-26 DIAGNOSIS — Z13.1 SCREENING FOR DIABETES MELLITUS: ICD-10-CM

## 2025-06-26 DIAGNOSIS — I10 ESSENTIAL HYPERTENSION: Chronic | ICD-10-CM

## 2025-06-26 DIAGNOSIS — M19.041 ARTHRITIS OF BOTH HANDS: Chronic | ICD-10-CM

## 2025-06-26 DIAGNOSIS — M19.042 ARTHRITIS OF BOTH HANDS: Chronic | ICD-10-CM

## 2025-06-26 DIAGNOSIS — Z13.220 SCREENING FOR HYPERLIPIDEMIA: ICD-10-CM

## 2025-06-26 LAB
CHOLEST SERPL-MCNC: 168 MG/DL
CHOLEST/HDLC SERPL: 3.9 {RATIO}
EST. AVERAGE GLUCOSE BLD GHB EST-MCNC: 126 MG/DL
HBA1C MFR BLD HPLC: 6 %
HDLC SERPL-MCNC: 43 MG/DL (ref 35–60)
LDLC SERPL CALC-MCNC: 110 MG/DL
LDLC/HDLC SERPL: 2.6 {RATIO}
NONHDLC SERPL-MCNC: 125 MG/DL
TRIGL SERPL-MCNC: 73 MG/DL (ref 37–140)
VLDLC SERPL-MCNC: 15 MG/DL

## 2025-06-26 PROCEDURE — 83036 HEMOGLOBIN GLYCOSYLATED A1C: CPT | Mod: ,,, | Performed by: CLINICAL MEDICAL LABORATORY

## 2025-06-26 PROCEDURE — 3008F BODY MASS INDEX DOCD: CPT | Mod: ,,, | Performed by: NURSE PRACTITIONER

## 2025-06-26 PROCEDURE — 1160F RVW MEDS BY RX/DR IN RCRD: CPT | Mod: ,,, | Performed by: NURSE PRACTITIONER

## 2025-06-26 PROCEDURE — 80061 LIPID PANEL: CPT | Mod: ,,, | Performed by: CLINICAL MEDICAL LABORATORY

## 2025-06-26 PROCEDURE — 3066F NEPHROPATHY DOC TX: CPT | Mod: ,,, | Performed by: NURSE PRACTITIONER

## 2025-06-26 PROCEDURE — 99396 PREV VISIT EST AGE 40-64: CPT | Mod: ,,, | Performed by: NURSE PRACTITIONER

## 2025-06-26 PROCEDURE — 3074F SYST BP LT 130 MM HG: CPT | Mod: ,,, | Performed by: NURSE PRACTITIONER

## 2025-06-26 PROCEDURE — 3044F HG A1C LEVEL LT 7.0%: CPT | Mod: ,,, | Performed by: NURSE PRACTITIONER

## 2025-06-26 PROCEDURE — 1159F MED LIST DOCD IN RCRD: CPT | Mod: ,,, | Performed by: NURSE PRACTITIONER

## 2025-06-26 PROCEDURE — 3078F DIAST BP <80 MM HG: CPT | Mod: ,,, | Performed by: NURSE PRACTITIONER

## 2025-06-26 PROCEDURE — 3061F NEG MICROALBUMINURIA REV: CPT | Mod: ,,, | Performed by: NURSE PRACTITIONER

## 2025-06-26 RX ORDER — HYDROCHLOROTHIAZIDE 12.5 MG/1
12.5 TABLET ORAL DAILY
Qty: 90 TABLET | Refills: 3 | Status: SHIPPED | OUTPATIENT
Start: 2025-06-26 | End: 2026-06-26

## 2025-06-26 RX ORDER — MELOXICAM 15 MG/1
15 TABLET ORAL DAILY PRN
Qty: 30 TABLET | Refills: 1 | Status: SHIPPED | OUTPATIENT
Start: 2025-06-26

## 2025-06-26 RX ORDER — AMLODIPINE BESYLATE 5 MG/1
5 TABLET ORAL DAILY
Qty: 90 TABLET | Refills: 3 | Status: SHIPPED | OUTPATIENT
Start: 2025-06-26 | End: 2026-06-26

## 2025-06-26 NOTE — ASSESSMENT & PLAN NOTE
Advised to try Tylenol for pain 1st but if it does not relieve her pain she can take meloxicam as needed.  Discussed risk of GI irritation and potential renal effects if use long-term and should avoid all other NSAIDs while taking meloxicam

## 2025-06-26 NOTE — PROGRESS NOTES
[facility]  [address] [place] [address]  Phone: [phone]  Fax: [phone]     PATIENT NAME: Asia Schwartz   : 1960    AGE: 64 y.o. DATE OF ENCOUNTER: 25    Provider:    N: [id number]      Subjective     Patient ID: Asia Schwartz is a 64 y.o. female.    Chief Complaint: Healthy You (Patient reports to the clinic today for [facility] Healthy You, she is fasting.) and Health Maintenance (TETANUS VACCINE declined/Pneumococcal Vaccines (Age 50+)(1 of 2 - PCV) declined/Colorectal Cancer Screening scheduled for 2025/Shingles Vaccine(1 of 2) declined/RSV Vaccine (Age 60+ and Pregnant patients)(1 - Risk 60-74 years 1-dose series) declined/COVID-19 Vaccine( season) declined)    History of Present Illness    HPI:  Patient is new to me and presents for a [facility] Healthy You wellness visit and needs a new PCP since Klarissa Infante NP left [facility].  Patient reports a history of diagnosed hypertension within the past 2-3 years. She had an episode where her eyes became bloodshot, leading to an emergency room visit where her condition was discovered. She had no prior symptoms and was unaware of her hypertension.    She mentions vision loss in her left eye, occurring around the time of her hypertension diagnosis approximately 2 years ago. She is legally blind in the left eye and is currently under the care of Dr. Javier at [facility] for this issue.    She describes arthritis symptoms in both hands, with swelling around the first joint of her fingers yesterday morning and a tingling sensation. She was previously prescribed Meloxicam for arthritis but does not recall taking it.    She reports a recent emergency room visit to [facility] in April due to chest pain, which she attributes to stress.    She denies any current symptoms related to her hypertension. She also denies having rheumatoid arthritis, confirmed by a negative test in August  ".      ROS:  Eyes: +loss of vision  Cardiovascular: +chest pain  Musculoskeletal: +joint swelling, +tingling           Tobacco Use: High Risk (2025)    Patient History     Smoking Tobacco Use: Every Day     Smokeless Tobacco Use: Never     Passive Exposure: Not on file     Review of patient's allergies indicates:  No Known Allergies  Current Outpatient Medications   Medication Instructions    amLODIPine (NORVASC) 5 mg, Oral, Daily    brimonidine-timoloL (COMBIGAN) 0.2-0.5 % Drop 1 drop, 2 times daily    hydroCHLOROthiazide 12.5 mg, Oral, Daily    hydrocortisone 2.5 % cream Topical (Top), 2 times daily    latanoprost 0.005 % ophthalmic solution SMARTSI Drop(s) Left Eye Every Evening    meloxicam (MOBIC) 15 mg, Oral, Daily PRN, Take with food.  Avoid other NSAIDs.    prednisoLONE acetate (PRED FORTE) 1 % DrpS Place into both eyes.     Medications Discontinued During This Encounter   Medication Reason    meloxicam (MOBIC) 15 MG tablet Reorder    amLODIPine (NORVASC) 5 MG tablet Reorder    hydroCHLOROthiazide 12.5 MG Tab Reorder       Past Medical History:   Diagnosis Date    Breast cancer screening by mammogram 2023    [facility]    Essential (primary) hypertension     Smokes     Vision impairment     left     Health Maintenance Topics with due status: Not Due       Topic Last Completion Date    Mammogram 2024    Lipid Panel 2025    Cervical Cancer Screening 2025    Influenza Vaccine Not Due     Immunization History   Administered Date(s) Administered    COVID-19 MRNA, LN-S PF (MODERNA HALF 0.25 ML DOSE) 2021, 07/15/2022    COVID-19 Vaccine 2021, 07/15/2022    COVID-19, MRNA, LN-S, PF (MODERNA FULL 0.5 ML DOSE) 2021, 2021       Objective     Body mass index is 23.75 kg/m².  Wt Readings from Last 3 Encounters:   25 72.9 kg (160 lb 12.8 oz)   25 71.7 kg (158 lb)   25 68.9 kg (152 lb)     Ht Readings from Last 3 Encounters:   25 5' 9" (1.753 m) " "  05/30/25 5' 9" (1.753 m)   04/03/25 5' 9" (1.753 m)     BP Readings from Last 3 Encounters:   06/26/25 100/67   05/30/25 111/78   04/03/25 105/75     Temp Readings from Last 3 Encounters:   06/26/25 98.8 °F (37.1 °C) (Oral)   04/03/25 98.6 °F (37 °C) (Oral)   11/27/23 98.2 °F (36.8 °C) (Oral)     Pulse Readings from Last 3 Encounters:   06/26/25 73   05/30/25 93   04/03/25 79     Resp Readings from Last 3 Encounters:   06/26/25 20   05/30/25 17   04/03/25 20     PF Readings from Last 3 Encounters:   No data found for PF       Physical Exam  Vitals and nursing note reviewed.   Constitutional:       General: She is not in acute distress.     Appearance: Normal appearance. She is not ill-appearing.   HENT:      Head: Normocephalic.      Right Ear: Tympanic membrane, ear canal and external ear normal.      Left Ear: Tympanic membrane, ear canal and external ear normal.      Nose: Nose normal.      Mouth/Throat:      Mouth: Mucous membranes are moist.      Pharynx: Oropharynx is clear. Uvula midline. No posterior oropharyngeal erythema or uvula swelling.   Eyes:      Conjunctiva/sclera: Conjunctivae normal.      Pupils: Pupils are equal, round, and reactive to light.   Neck:      Thyroid: No thyroid mass or thyromegaly.      Vascular: Normal carotid pulses. No carotid bruit.   Cardiovascular:      Rate and Rhythm: Normal rate and regular rhythm.      Pulses: Normal pulses.      Heart sounds: Normal heart sounds.   Pulmonary:      Effort: Pulmonary effort is normal. No respiratory distress.      Breath sounds: Normal breath sounds. No wheezing, rhonchi or rales.   Abdominal:      Palpations: Abdomen is soft.      Tenderness: There is no abdominal tenderness.   Musculoskeletal:      Cervical back: Neck supple.      Right lower leg: No edema.      Left lower leg: No edema.   Lymphadenopathy:      Cervical: No cervical adenopathy.   Skin:     General: Skin is warm and dry.      Coloration: Skin is not jaundiced or pale. "   Neurological:      General: No focal deficit present.      Mental Status: She is alert and oriented to person, place, and time.      Gait: Gait normal.   Psychiatric:         Mood and Affect: Mood normal.         Behavior: Behavior normal.         Assessment and Plan     1. Routine general medical examination at a health care facility    2. Arthritis of both hands  Assessment & Plan:  Advised to try Tylenol for pain 1st but if it does not relieve her pain she can take meloxicam as needed.  Discussed risk of GI irritation and potential renal effects if use long-term and should avoid all other NSAIDs while taking meloxicam    Orders:  -     meloxicam (MOBIC) 15 MG tablet; Take 1 tablet (15 mg total) by mouth daily as needed for Pain. Take with food.  Avoid other NSAIDs.  Dispense: 30 tablet; Refill: 1    3. Screening for diabetes mellitus  -     Hemoglobin A1C; Future; Expected date: 06/26/2025    4. Screening for hyperlipidemia  -     Lipid Panel; Future; Expected date: 06/26/2025    5. Vision loss of left eye  Overview:  Left eye legally blind.  Dr. Javier at [facility]      6. Essential hypertension  Assessment & Plan:  Well controlled  Continue amlodipine 5 mg daily and hydrochlorothiazide 12.5 mg daily.    Orders:  -     amLODIPine (NORVASC) 5 MG tablet; Take 1 tablet (5 mg total) by mouth once daily.  Dispense: 90 tablet; Refill: 3  -     hydroCHLOROthiazide 12.5 MG Tab; Take 1 tablet (12.5 mg total) by mouth once daily.  Dispense: 90 tablet; Refill: 3       Plan:   Assessment & Plan    IMPRESSION:  - BP well-managed on current regimen of amlodipine 5 mg daily and HCTZ 12.5 mg daily.  - Reviewed recent labs from ER visit in April, noting normal blood count, metabolic panel, kidney and liver function.    ESSENTIAL HYPERTENSION:  - Monitored the patient's blood pressure, which is currently 100/67, considered well-controlled.  - Evaluated response to current medication regimen of amlodipine 5 mg daily and  hydrochlorothiazide 12.5 mg daily, which will be continued.  - Educated the patient about the silent nature of hypertension and its potential effects as a person ages.  - Due to well-controlled blood pressure, determined no need for a 6-month follow-up; scheduled next wellness visit for 07/14/26.    BLINDNESS LEFT EYE:  - Noted the patient has vision loss (legal blindness) in the left eye following a hypertensive episode.  - Confirmed the patient has an appointment with Dr. Javier at [facility] for ophthalmologic evaluation and instructed to follow up there for ongoing management of ocular condition.    OSTEOARTHRITIS OF HANDS:  - Noted the patient reports swelling and tingling sensation in both hands, particularly around the first joint.  - Reviewed negative test results for rheumatoid arthritis conducted in August 2023.  - Prescribed Meloxicam for arthritis symptomatic relief, to be taken as needed on days with significant inflammation and soreness.  - Provided medication instructions: take with food to avoid gastric irritation, avoid mixing with other NSAIDs like ibuprofen or naproxen, and educated on risks and benefits including potential renal effects and GI irritation.  - Informed the patient that acetaminophen can be taken in addition to current medications if needed.    CHEST PAIN:  - Documented the patient had an Emergency Department visit in April due to chest pain.  - Reviewed labs from ED visit which showed normal results for complete blood count, comprehensive metabolic panel, renal function, and hepatic function.  - Discussed stress as a potential contributing factor to the patient's chest pain.    PREVENTIVE CARE AND FOLLOW-UP:  - Recommend consideration of shingles and pneumococcal vaccines.  - Explained importance, benefits and potential side effects of shingles vaccine, noting it may cause arm soreness or temporary malaise.  - Ordered A1C and lipid panel labs for wellness visit.  - Advised to  contact office if colonoscopy preparation information is not received in coming weeks for colonoscopy scheduled with Dr. Jerome España for 08/25/2025.  - Follow up in 1 year for wellness visit on 07/14/26.  - Instructed to contact the office if any issues arise before next scheduled appointment and to use patient portal for communication with office, especially for appointment requests or health concerns between visits.             I have reviewed the medications, allergies, and problem list.     Goal Actions:    What type of visit is the patient here for today?: Healthy You  Does the patient consent to enroll in SideStep Me Healthy?: No  Is this a Wellness Follow Up?: No  What is your overall wellness goal? (select at least one): Improve overall health  Choose 3: Biometric, Nutrition, Lifestyle  Biometric Actions: Attend regularly scheduled office visits  Lifestyle Actions : Take medications as prescribed  Nurtrition Actions: Eat heart healthy diet, drink 8-10 glasses of water per day      Follow up for Healthy You 1 yr with fasting labs, and follow-up as needed.    Signature:  SAMANTHA King-BC    Future Appointments   Date Time Provider Department Center   8/25/2025 10:00 AM [facility] GI ROOM 01 [facility]   7/14/2026  9:20 AM Jasmine Abdullahi FNP [facility] SARY Batista   This note was generated with the assistance of ambient listening technology. Verbal consent was obtained by the patient and accompanying visitor(s) for the recording of patient appointment to facilitate this note. I attest to having reviewed and edited the generated note for accuracy, though some syntax or spelling errors may persist. Please contact the author of this note for any clarification.

## 2025-06-27 ENCOUNTER — RESULTS FOLLOW-UP (OUTPATIENT)
Dept: FAMILY MEDICINE | Facility: CLINIC | Age: 65
End: 2025-06-27

## 2025-06-27 ENCOUNTER — PATIENT OUTREACH (OUTPATIENT)
Facility: HOSPITAL | Age: 65
End: 2025-06-27
Payer: COMMERCIAL

## 2025-06-27 DIAGNOSIS — E78.5 DYSLIPIDEMIA WITH ELEVATED LOW DENSITY LIPOPROTEIN (LDL) CHOLESTEROL AND ABNORMALLY LOW HIGH DENSITY LIPOPROTEIN (HDL) CHOLESTEROL: Primary | ICD-10-CM

## 2025-06-27 RX ORDER — ATORVASTATIN CALCIUM 10 MG/1
10 TABLET, FILM COATED ORAL DAILY
Qty: 90 TABLET | Refills: 3 | Status: SHIPPED | OUTPATIENT
Start: 2025-06-27

## 2025-06-27 NOTE — PROGRESS NOTES
Population Health Chart Review & Patient Outreach Details      Further Action Needed If Patient Returns Outreach:        Health Maintenance Due   Topic Date Due    TETANUS VACCINE  Never done    Pneumococcal Vaccines (Age 50+) (1 of 2 - PCV) Never done    Colorectal Cancer Screening  Never done    Shingles Vaccine (1 of 2) Never done    RSV Vaccine (Age 60+ and Pregnant patients) (1 - Risk 60-74 years 1-dose series) Never done    COVID-19 Vaccine ( season) 2024          Updates Requested / Reviewed:     []  Care Everywhere    []     []  External Sources ([facility], [facility], [facility], etc.)    [] [facility]   [] [facility]   [] Other:    []  Care Team Updated   []  Removed  or Duplicate Orders   []  Immunization Reconciliation Completed / Queried    [] [place]   [] [place]   [] [place]   [] [place]      Health Maintenance Topics Addressed and Outreach Outcomes / Actions Taken:             Breast Cancer Screening []  Mammogram Order Placed    []  Mammogram Screening Scheduled    []  External Records Requested & Care Team Updated if Applicable    []  External Records Uploaded & Care Team Updated if Applicable    []  Pt Declined Scheduling Mammogram    []  Pt Will Schedule with External Provider / Order Routed & Care Team Updated if Applicable              Cervical Cancer Screening []  Pap Smear Scheduled in Primary Care or OBGYN    []  External Records Requested & Care Team Updated if Applicable       []  External Records Uploaded, Care Team Updated, & History Updated if Applicable    []  Patient Declined Scheduling Pap Smear    []  Patient Will Schedule with External Provider & Care Team Updated if Applicable                  Colorectal Cancer Screening []  Colonoscopy Case Request / Referral / Home Test Order Placed    []  External Records Requested & Care Team Updated if Applicable    []  External Records Uploaded, Care Team Updated, & History Updated if Applicable    []  Patient Declined  Completing Colon Cancer Screening    []  Patient Will Schedule with External Provider & Care Team Updated if Applicable    []  Fit Kit Mailed (add the SmartPhrase under additional notes)    []  Reminded Patient to Complete Home Test                Diabetic Eye Exam []  Eye Exam Screening Order Placed    []  Eye Camera Scheduled or Optometry/Ophthalmology Referral Placed    []  External Records Requested & Care Team Updated if Applicable    []  External Records Uploaded, Care Team Updated, & History Updated if Applicable    []  Patient Declined Scheduling Eye Exam    []  Patient Will Schedule with External Provider & Care Team Updated if Applicable             Blood Pressure Control []  Primary Care Follow Up Visit Scheduled     []  Remote Blood Pressure Reading Captured    []  Patient Declined Remote Reading or Scheduling Appt - Escalated to PCP    []  Patient Will Call Back or Send Portal Message with Reading                 HbA1c & Other Labs []  Overdue Lab(s) Ordered    []  Overdue Lab(s) Scheduled    []  External Records Uploaded & Care Team Updated if Applicable    []  Primary Care Follow Up Visit Scheduled     []  Reminded Patient to Complete A1c Home Test    []  Patient Declined Scheduling Labs or Will Call Back to Schedule    []  Patient Will Schedule with External Provider / Order Routed, & Care Team Updated if Applicable           Primary Care Appointment []  Primary Care Appt Scheduled    []  Patient Declined Scheduling or Will Call Back to Schedule    []  Pt Established with External Provider, Updated Care Team, & Informed Pt to Notify Payor if Applicable           Medication Adherence /    Statin Use []  Primary Care Appointment Scheduled    []  Patient Reminded to  Prescription    []  Patient Declined, Provider Notified if Needed    []  Sent Provider Message to Review to Evaluate Pt for Statin, Add Exclusion Dx Codes, Document   Exclusion in Problem List, Change Statin Intensity Level to  Moderate or High Intensity if Applicable                Osteoporosis Screening []  Dexa Order Placed    []  Dexa Appointment Scheduled    []  External Records Requested & Care Team Updated    []  External Records Uploaded, Care Team Updated, & History Updated if Applicable    []  Patient Declined Scheduling Dexa or Will Call Back to Schedule    []  Patient Will Schedule with External Provider / Order Routed & Care Team Updated if Applicable       Additional Notes:       Post visit Population Health review of encounter with date of service  6/26/25 with Kaylen.  All required HY components in encounter.    Followup appt for: 7/14/26 HY

## 2025-08-11 ENCOUNTER — TELEPHONE (OUTPATIENT)
Dept: GASTROENTEROLOGY | Facility: CLINIC | Age: 65
End: 2025-08-11
Payer: COMMERCIAL

## 2025-08-31 ENCOUNTER — OFFICE VISIT (OUTPATIENT)
Dept: FAMILY MEDICINE | Facility: CLINIC | Age: 65
End: 2025-08-31
Payer: COMMERCIAL

## 2025-08-31 VITALS
TEMPERATURE: 98 F | OXYGEN SATURATION: 97 % | WEIGHT: 165 LBS | HEART RATE: 107 BPM | BODY MASS INDEX: 24.37 KG/M2 | DIASTOLIC BLOOD PRESSURE: 84 MMHG | SYSTOLIC BLOOD PRESSURE: 120 MMHG

## 2025-08-31 DIAGNOSIS — J40 BRONCHITIS: Primary | ICD-10-CM

## 2025-08-31 DIAGNOSIS — Z20.828 EXPOSURE TO VIRAL DISEASE: ICD-10-CM

## 2025-08-31 LAB
CTP QC/QA: YES
POC RSV RAPID ANT MOLECULAR: NEGATIVE

## 2025-08-31 PROCEDURE — 3074F SYST BP LT 130 MM HG: CPT | Mod: ,,, | Performed by: FAMILY MEDICINE

## 2025-08-31 PROCEDURE — 3061F NEG MICROALBUMINURIA REV: CPT | Mod: ,,, | Performed by: FAMILY MEDICINE

## 2025-08-31 PROCEDURE — 99214 OFFICE O/P EST MOD 30 MIN: CPT | Mod: 25,,, | Performed by: FAMILY MEDICINE

## 2025-08-31 PROCEDURE — 3066F NEPHROPATHY DOC TX: CPT | Mod: ,,, | Performed by: FAMILY MEDICINE

## 2025-08-31 PROCEDURE — 3008F BODY MASS INDEX DOCD: CPT | Mod: ,,, | Performed by: FAMILY MEDICINE

## 2025-08-31 PROCEDURE — 87634 RSV DNA/RNA AMP PROBE: CPT | Mod: QW,,, | Performed by: FAMILY MEDICINE

## 2025-08-31 PROCEDURE — 96372 THER/PROPH/DIAG INJ SC/IM: CPT | Mod: ,,, | Performed by: FAMILY MEDICINE

## 2025-08-31 PROCEDURE — 99051 MED SERV EVE/WKEND/HOLIDAY: CPT | Mod: ,,, | Performed by: FAMILY MEDICINE

## 2025-08-31 PROCEDURE — 1160F RVW MEDS BY RX/DR IN RCRD: CPT | Mod: ,,, | Performed by: FAMILY MEDICINE

## 2025-08-31 PROCEDURE — 1159F MED LIST DOCD IN RCRD: CPT | Mod: ,,, | Performed by: FAMILY MEDICINE

## 2025-08-31 PROCEDURE — 3044F HG A1C LEVEL LT 7.0%: CPT | Mod: ,,, | Performed by: FAMILY MEDICINE

## 2025-08-31 PROCEDURE — 3079F DIAST BP 80-89 MM HG: CPT | Mod: ,,, | Performed by: FAMILY MEDICINE

## 2025-08-31 RX ORDER — CEFTRIAXONE 500 MG/1
500 INJECTION, POWDER, FOR SOLUTION INTRAMUSCULAR; INTRAVENOUS
Status: COMPLETED | OUTPATIENT
Start: 2025-08-31 | End: 2025-08-31

## 2025-08-31 RX ORDER — AZITHROMYCIN 250 MG/1
TABLET, FILM COATED ORAL
Qty: 6 TABLET | Refills: 0 | Status: SHIPPED | OUTPATIENT
Start: 2025-08-31

## 2025-08-31 RX ORDER — PREDNISONE 20 MG/1
20 TABLET ORAL DAILY
Qty: 5 TABLET | Refills: 0 | Status: SHIPPED | OUTPATIENT
Start: 2025-08-31 | End: 2025-09-05

## 2025-08-31 RX ORDER — BENZONATATE 100 MG/1
100 CAPSULE ORAL 3 TIMES DAILY PRN
Qty: 20 CAPSULE | Refills: 0 | Status: SHIPPED | OUTPATIENT
Start: 2025-08-31

## 2025-08-31 RX ADMIN — CEFTRIAXONE 500 MG: 500 INJECTION, POWDER, FOR SOLUTION INTRAMUSCULAR; INTRAVENOUS at 11:08
